# Patient Record
Sex: FEMALE | Race: WHITE | NOT HISPANIC OR LATINO | Employment: OTHER | URBAN - METROPOLITAN AREA
[De-identification: names, ages, dates, MRNs, and addresses within clinical notes are randomized per-mention and may not be internally consistent; named-entity substitution may affect disease eponyms.]

---

## 2017-03-02 ENCOUNTER — OFFICE VISIT - RIVER FALLS (OUTPATIENT)
Dept: FAMILY MEDICINE | Facility: CLINIC | Age: 72
End: 2017-03-02

## 2017-03-02 ASSESSMENT — MIFFLIN-ST. JEOR: SCORE: 1307.34

## 2017-03-30 ENCOUNTER — OFFICE VISIT - RIVER FALLS (OUTPATIENT)
Dept: FAMILY MEDICINE | Facility: CLINIC | Age: 72
End: 2017-03-30

## 2017-05-25 ENCOUNTER — OFFICE VISIT - RIVER FALLS (OUTPATIENT)
Dept: FAMILY MEDICINE | Facility: CLINIC | Age: 72
End: 2017-05-25

## 2017-05-25 ASSESSMENT — MIFFLIN-ST. JEOR: SCORE: 1311.87

## 2017-06-01 ENCOUNTER — OFFICE VISIT - RIVER FALLS (OUTPATIENT)
Dept: FAMILY MEDICINE | Facility: CLINIC | Age: 72
End: 2017-06-01

## 2017-06-22 ENCOUNTER — OFFICE VISIT - RIVER FALLS (OUTPATIENT)
Dept: FAMILY MEDICINE | Facility: CLINIC | Age: 72
End: 2017-06-22

## 2017-06-26 ENCOUNTER — AMBULATORY - RIVER FALLS (OUTPATIENT)
Dept: FAMILY MEDICINE | Facility: CLINIC | Age: 72
End: 2017-06-26

## 2017-06-27 ENCOUNTER — AMBULATORY - RIVER FALLS (OUTPATIENT)
Dept: FAMILY MEDICINE | Facility: CLINIC | Age: 72
End: 2017-06-27

## 2017-07-01 ENCOUNTER — OFFICE VISIT - RIVER FALLS (OUTPATIENT)
Dept: FAMILY MEDICINE | Facility: CLINIC | Age: 72
End: 2017-07-01

## 2017-07-17 ENCOUNTER — AMBULATORY - RIVER FALLS (OUTPATIENT)
Dept: FAMILY MEDICINE | Facility: CLINIC | Age: 72
End: 2017-07-17

## 2017-07-18 LAB
CREAT SERPL-MCNC: 0.82 MG/DL (ref 0.6–0.93)
GLUCOSE BLD-MCNC: 134 MG/DL (ref 65–99)

## 2017-07-24 ENCOUNTER — OFFICE VISIT - RIVER FALLS (OUTPATIENT)
Dept: FAMILY MEDICINE | Facility: CLINIC | Age: 72
End: 2017-07-24

## 2017-07-24 ASSESSMENT — MIFFLIN-ST. JEOR: SCORE: 1316.41

## 2017-08-08 ENCOUNTER — OFFICE VISIT - RIVER FALLS (OUTPATIENT)
Dept: FAMILY MEDICINE | Facility: CLINIC | Age: 72
End: 2017-08-08

## 2017-08-15 LAB
CREAT SERPL-MCNC: 0.9 MG/DL (ref 0.6–0.93)
GLUCOSE BLD-MCNC: 108 MG/DL (ref 65–99)

## 2017-10-05 ENCOUNTER — OFFICE VISIT - RIVER FALLS (OUTPATIENT)
Dept: FAMILY MEDICINE | Facility: CLINIC | Age: 72
End: 2017-10-05

## 2017-10-05 ASSESSMENT — MIFFLIN-ST. JEOR: SCORE: 1291.91

## 2018-03-08 ENCOUNTER — OFFICE VISIT - RIVER FALLS (OUTPATIENT)
Dept: FAMILY MEDICINE | Facility: CLINIC | Age: 73
End: 2018-03-08

## 2018-03-12 ENCOUNTER — OFFICE VISIT - RIVER FALLS (OUTPATIENT)
Dept: FAMILY MEDICINE | Facility: CLINIC | Age: 73
End: 2018-03-12

## 2018-03-12 ASSESSMENT — MIFFLIN-ST. JEOR: SCORE: 1299.17

## 2018-03-13 LAB
CHOLEST SERPL-MCNC: 187 MG/DL
CHOLEST/HDLC SERPL: 3.3 {RATIO}
CREAT SERPL-MCNC: 0.81 MG/DL (ref 0.6–0.93)
GLUCOSE BLD-MCNC: 106 MG/DL (ref 65–99)
HDLC SERPL-MCNC: 57 MG/DL
LDLC SERPL CALC-MCNC: 107 MG/DL
NONHDLC SERPL-MCNC: 130 MG/DL
TRIGL SERPL-MCNC: 124 MG/DL

## 2018-03-19 ENCOUNTER — OFFICE VISIT - RIVER FALLS (OUTPATIENT)
Dept: FAMILY MEDICINE | Facility: CLINIC | Age: 73
End: 2018-03-19

## 2018-03-19 ENCOUNTER — AMBULATORY - RIVER FALLS (OUTPATIENT)
Dept: FAMILY MEDICINE | Facility: CLINIC | Age: 73
End: 2018-03-19

## 2018-03-27 ENCOUNTER — OFFICE VISIT - RIVER FALLS (OUTPATIENT)
Dept: FAMILY MEDICINE | Facility: CLINIC | Age: 73
End: 2018-03-27

## 2018-04-17 ENCOUNTER — OFFICE VISIT - RIVER FALLS (OUTPATIENT)
Dept: FAMILY MEDICINE | Facility: CLINIC | Age: 73
End: 2018-04-17

## 2018-08-20 ENCOUNTER — OFFICE VISIT - RIVER FALLS (OUTPATIENT)
Dept: FAMILY MEDICINE | Facility: CLINIC | Age: 73
End: 2018-08-20

## 2018-08-20 ASSESSMENT — MIFFLIN-ST. JEOR: SCORE: 1281.03

## 2018-09-06 ENCOUNTER — OFFICE VISIT - RIVER FALLS (OUTPATIENT)
Dept: FAMILY MEDICINE | Facility: CLINIC | Age: 73
End: 2018-09-06

## 2018-10-11 ENCOUNTER — OFFICE VISIT - RIVER FALLS (OUTPATIENT)
Dept: FAMILY MEDICINE | Facility: CLINIC | Age: 73
End: 2018-10-11

## 2019-02-28 ENCOUNTER — OFFICE VISIT - RIVER FALLS (OUTPATIENT)
Dept: FAMILY MEDICINE | Facility: CLINIC | Age: 74
End: 2019-02-28

## 2019-03-07 ENCOUNTER — OFFICE VISIT - RIVER FALLS (OUTPATIENT)
Dept: FAMILY MEDICINE | Facility: CLINIC | Age: 74
End: 2019-03-07

## 2019-03-13 ENCOUNTER — OFFICE VISIT - RIVER FALLS (OUTPATIENT)
Dept: FAMILY MEDICINE | Facility: CLINIC | Age: 74
End: 2019-03-13

## 2019-03-13 ASSESSMENT — MIFFLIN-ST. JEOR: SCORE: 1295.54

## 2019-03-14 LAB
BUN SERPL-MCNC: 20 MG/DL (ref 7–25)
BUN/CREAT RATIO - HISTORICAL: ABNORMAL (ref 6–22)
CALCIUM SERPL-MCNC: 9.5 MG/DL (ref 8.6–10.4)
CHLORIDE BLD-SCNC: 103 MMOL/L (ref 98–110)
CO2 SERPL-SCNC: 27 MMOL/L (ref 20–32)
CREAT SERPL-MCNC: 0.87 MG/DL (ref 0.6–0.93)
EGFRCR SERPLBLD CKD-EPI 2021: 66 ML/MIN/1.73M2
GLUCOSE BLD-MCNC: 108 MG/DL (ref 65–99)
POTASSIUM BLD-SCNC: 3.9 MMOL/L (ref 3.5–5.3)
SODIUM SERPL-SCNC: 140 MMOL/L (ref 135–146)
TSH SERPL DL<=0.005 MIU/L-ACNC: 4.45 MIU/L (ref 0.4–4.5)

## 2019-07-30 ENCOUNTER — OFFICE VISIT - RIVER FALLS (OUTPATIENT)
Dept: FAMILY MEDICINE | Facility: CLINIC | Age: 74
End: 2019-07-30

## 2019-09-05 ENCOUNTER — OFFICE VISIT - RIVER FALLS (OUTPATIENT)
Dept: FAMILY MEDICINE | Facility: CLINIC | Age: 74
End: 2019-09-05

## 2019-11-26 ENCOUNTER — OFFICE VISIT - RIVER FALLS (OUTPATIENT)
Dept: FAMILY MEDICINE | Facility: CLINIC | Age: 74
End: 2019-11-26

## 2020-03-03 ENCOUNTER — OFFICE VISIT - RIVER FALLS (OUTPATIENT)
Dept: FAMILY MEDICINE | Facility: CLINIC | Age: 75
End: 2020-03-03

## 2020-03-25 ENCOUNTER — OFFICE VISIT - RIVER FALLS (OUTPATIENT)
Dept: FAMILY MEDICINE | Facility: CLINIC | Age: 75
End: 2020-03-25

## 2020-06-29 ENCOUNTER — OFFICE VISIT - RIVER FALLS (OUTPATIENT)
Dept: FAMILY MEDICINE | Facility: CLINIC | Age: 75
End: 2020-06-29

## 2020-06-29 ASSESSMENT — MIFFLIN-ST. JEOR: SCORE: 1303.71

## 2020-08-26 ENCOUNTER — OFFICE VISIT - RIVER FALLS (OUTPATIENT)
Dept: FAMILY MEDICINE | Facility: CLINIC | Age: 75
End: 2020-08-26

## 2020-09-01 ENCOUNTER — AMBULATORY - RIVER FALLS (OUTPATIENT)
Dept: FAMILY MEDICINE | Facility: CLINIC | Age: 75
End: 2020-09-01

## 2020-09-02 LAB
BUN SERPL-MCNC: 19 MG/DL (ref 7–25)
BUN/CREAT RATIO - HISTORICAL: ABNORMAL (ref 6–22)
CALCIUM SERPL-MCNC: 9.5 MG/DL (ref 8.6–10.4)
CHLORIDE BLD-SCNC: 102 MMOL/L (ref 98–110)
CHOLEST SERPL-MCNC: 178 MG/DL
CHOLEST/HDLC SERPL: 4 {RATIO}
CO2 SERPL-SCNC: 29 MMOL/L (ref 20–32)
CREAT SERPL-MCNC: 0.9 MG/DL (ref 0.6–0.93)
EGFRCR SERPLBLD CKD-EPI 2021: 63 ML/MIN/1.73M2
GLUCOSE BLD-MCNC: 104 MG/DL (ref 65–99)
HDLC SERPL-MCNC: 45 MG/DL
LDLC SERPL CALC-MCNC: 107 MG/DL
NONHDLC SERPL-MCNC: 133 MG/DL
POTASSIUM BLD-SCNC: 4 MMOL/L (ref 3.5–5.3)
SODIUM SERPL-SCNC: 139 MMOL/L (ref 135–146)
TRIGL SERPL-MCNC: 147 MG/DL
TSH SERPL DL<=0.005 MIU/L-ACNC: 3.35 MIU/L (ref 0.4–4.5)

## 2020-09-08 ENCOUNTER — COMMUNICATION - RIVER FALLS (OUTPATIENT)
Dept: FAMILY MEDICINE | Facility: CLINIC | Age: 75
End: 2020-09-08

## 2020-09-16 ENCOUNTER — OFFICE VISIT - RIVER FALLS (OUTPATIENT)
Dept: FAMILY MEDICINE | Facility: CLINIC | Age: 75
End: 2020-09-16

## 2020-09-16 ASSESSMENT — MIFFLIN-ST. JEOR: SCORE: 1302.8

## 2020-11-17 ENCOUNTER — OFFICE VISIT - RIVER FALLS (OUTPATIENT)
Dept: FAMILY MEDICINE | Facility: CLINIC | Age: 75
End: 2020-11-17

## 2020-11-24 ENCOUNTER — OFFICE VISIT - RIVER FALLS (OUTPATIENT)
Dept: FAMILY MEDICINE | Facility: CLINIC | Age: 75
End: 2020-11-24

## 2020-12-08 ENCOUNTER — OFFICE VISIT - RIVER FALLS (OUTPATIENT)
Dept: FAMILY MEDICINE | Facility: CLINIC | Age: 75
End: 2020-12-08

## 2021-05-04 ENCOUNTER — OFFICE VISIT - RIVER FALLS (OUTPATIENT)
Dept: FAMILY MEDICINE | Facility: CLINIC | Age: 76
End: 2021-05-04

## 2021-09-20 ENCOUNTER — TRANSFERRED RECORDS (OUTPATIENT)
Dept: MULTI SPECIALTY CLINIC | Facility: CLINIC | Age: 76
End: 2021-09-20

## 2021-09-20 ENCOUNTER — OFFICE VISIT - RIVER FALLS (OUTPATIENT)
Dept: FAMILY MEDICINE | Facility: CLINIC | Age: 76
End: 2021-09-20

## 2021-09-20 ASSESSMENT — MIFFLIN-ST. JEOR: SCORE: 1295.54

## 2021-09-21 ENCOUNTER — COMMUNICATION - RIVER FALLS (OUTPATIENT)
Dept: FAMILY MEDICINE | Facility: CLINIC | Age: 76
End: 2021-09-21

## 2021-09-21 LAB
BUN SERPL-MCNC: 18 MG/DL (ref 7–25)
BUN/CREAT RATIO - HISTORICAL: ABNORMAL (ref 6–22)
CALCIUM SERPL-MCNC: 9.8 MG/DL (ref 8.6–10.4)
CHLORIDE BLD-SCNC: 104 MMOL/L (ref 98–110)
CHOLEST SERPL-MCNC: 199 MG/DL
CHOLEST/HDLC SERPL: 4 {RATIO}
CO2 SERPL-SCNC: 28 MMOL/L (ref 20–32)
CREAT SERPL-MCNC: 0.86 MG/DL (ref 0.6–0.93)
EGFRCR SERPLBLD CKD-EPI 2021: 66 ML/MIN/1.73M2
GLUCOSE BLD-MCNC: 101 MG/DL (ref 65–99)
HDLC SERPL-MCNC: 50 MG/DL
LDLC SERPL CALC-MCNC: 120 MG/DL
NONHDLC SERPL-MCNC: 149 MG/DL
POTASSIUM BLD-SCNC: 3.9 MMOL/L (ref 3.5–5.3)
SODIUM SERPL-SCNC: 141 MMOL/L (ref 135–146)
TRIGL SERPL-MCNC: 176 MG/DL
TSH SERPL DL<=0.005 MIU/L-ACNC: 1.97 MIU/L (ref 0.4–4.5)

## 2021-10-27 ENCOUNTER — COMMUNICATION - RIVER FALLS (OUTPATIENT)
Dept: FAMILY MEDICINE | Facility: CLINIC | Age: 76
End: 2021-10-27

## 2021-11-02 ENCOUNTER — OFFICE VISIT - RIVER FALLS (OUTPATIENT)
Dept: FAMILY MEDICINE | Facility: CLINIC | Age: 76
End: 2021-11-02

## 2021-11-23 ENCOUNTER — OFFICE VISIT - RIVER FALLS (OUTPATIENT)
Dept: FAMILY MEDICINE | Facility: CLINIC | Age: 76
End: 2021-11-23

## 2022-02-12 VITALS
HEIGHT: 68 IN | TEMPERATURE: 98 F | HEART RATE: 76 BPM | WEIGHT: 167.2 LBS | DIASTOLIC BLOOD PRESSURE: 82 MMHG | SYSTOLIC BLOOD PRESSURE: 130 MMHG | BODY MASS INDEX: 25.34 KG/M2

## 2022-02-12 VITALS
TEMPERATURE: 97.6 F | SYSTOLIC BLOOD PRESSURE: 128 MMHG | DIASTOLIC BLOOD PRESSURE: 80 MMHG | WEIGHT: 168.4 LBS | BODY MASS INDEX: 25.61 KG/M2 | DIASTOLIC BLOOD PRESSURE: 88 MMHG | BODY MASS INDEX: 25.58 KG/M2 | WEIGHT: 168.8 LBS | TEMPERATURE: 97.3 F | SYSTOLIC BLOOD PRESSURE: 130 MMHG | HEART RATE: 64 BPM | HEIGHT: 68 IN | SYSTOLIC BLOOD PRESSURE: 149 MMHG | DIASTOLIC BLOOD PRESSURE: 78 MMHG | HEART RATE: 72 BPM

## 2022-02-12 VITALS
WEIGHT: 164.8 LBS | DIASTOLIC BLOOD PRESSURE: 88 MMHG | HEART RATE: 68 BPM | HEIGHT: 68 IN | SYSTOLIC BLOOD PRESSURE: 146 MMHG | TEMPERATURE: 97.1 F | BODY MASS INDEX: 24.98 KG/M2

## 2022-02-12 VITALS
HEART RATE: 69 BPM | TEMPERATURE: 98.8 F | DIASTOLIC BLOOD PRESSURE: 80 MMHG | HEIGHT: 68 IN | BODY MASS INDEX: 26.01 KG/M2 | WEIGHT: 171.6 LBS | SYSTOLIC BLOOD PRESSURE: 126 MMHG

## 2022-02-12 VITALS
BODY MASS INDEX: 25.7 KG/M2 | WEIGHT: 169.6 LBS | HEIGHT: 68 IN | DIASTOLIC BLOOD PRESSURE: 97 MMHG | BODY MASS INDEX: 25.82 KG/M2 | DIASTOLIC BLOOD PRESSURE: 80 MMHG | BODY MASS INDEX: 25.82 KG/M2 | SYSTOLIC BLOOD PRESSURE: 149 MMHG | HEART RATE: 78 BPM | SYSTOLIC BLOOD PRESSURE: 140 MMHG | OXYGEN SATURATION: 95 % | HEIGHT: 68 IN | HEIGHT: 68 IN | TEMPERATURE: 98.3 F | HEART RATE: 76 BPM

## 2022-02-12 VITALS
WEIGHT: 168 LBS | HEIGHT: 68 IN | SYSTOLIC BLOOD PRESSURE: 142 MMHG | DIASTOLIC BLOOD PRESSURE: 90 MMHG | DIASTOLIC BLOOD PRESSURE: 80 MMHG | HEART RATE: 68 BPM | BODY MASS INDEX: 25.46 KG/M2 | TEMPERATURE: 97.8 F | SYSTOLIC BLOOD PRESSURE: 150 MMHG | HEART RATE: 80 BPM

## 2022-02-12 VITALS — DIASTOLIC BLOOD PRESSURE: 90 MMHG | SYSTOLIC BLOOD PRESSURE: 146 MMHG

## 2022-02-12 VITALS
DIASTOLIC BLOOD PRESSURE: 89 MMHG | TEMPERATURE: 98 F | WEIGHT: 172.6 LBS | HEIGHT: 68 IN | HEART RATE: 80 BPM | HEART RATE: 78 BPM | DIASTOLIC BLOOD PRESSURE: 86 MMHG | SYSTOLIC BLOOD PRESSURE: 154 MMHG | DIASTOLIC BLOOD PRESSURE: 78 MMHG | SYSTOLIC BLOOD PRESSURE: 130 MMHG | BODY MASS INDEX: 26.16 KG/M2 | SYSTOLIC BLOOD PRESSURE: 161 MMHG

## 2022-02-12 VITALS
TEMPERATURE: 98.4 F | WEIGHT: 169.8 LBS | HEART RATE: 72 BPM | DIASTOLIC BLOOD PRESSURE: 88 MMHG | HEIGHT: 68 IN | SYSTOLIC BLOOD PRESSURE: 148 MMHG | BODY MASS INDEX: 25.73 KG/M2

## 2022-02-12 VITALS
HEIGHT: 68 IN | BODY MASS INDEX: 25.46 KG/M2 | TEMPERATURE: 97.6 F | HEART RATE: 72 BPM | DIASTOLIC BLOOD PRESSURE: 94 MMHG | SYSTOLIC BLOOD PRESSURE: 166 MMHG | WEIGHT: 168 LBS

## 2022-02-12 VITALS — SYSTOLIC BLOOD PRESSURE: 138 MMHG | DIASTOLIC BLOOD PRESSURE: 86 MMHG

## 2022-02-12 VITALS
SYSTOLIC BLOOD PRESSURE: 152 MMHG | BODY MASS INDEX: 25.85 KG/M2 | DIASTOLIC BLOOD PRESSURE: 96 MMHG | TEMPERATURE: 97.1 F | HEART RATE: 72 BPM | HEIGHT: 68 IN | WEIGHT: 170.6 LBS

## 2022-02-12 VITALS — DIASTOLIC BLOOD PRESSURE: 86 MMHG | SYSTOLIC BLOOD PRESSURE: 136 MMHG

## 2022-02-15 NOTE — TELEPHONE ENCOUNTER
Entered by Vannessa Laird on March 24, 2020 12:50:25 PM CDT  Patient is not on the schedule for  7/6/20. I LVM to call back and reschedule AWV for July/August.      ---------------------  From: Vinay WHIPPLE, [place]   To: Appointment Pool ([id number] - [place]);     Sent: 3/24/2020 11:28:44 AM CDT  Subject: General Message     CSS spoke w/ pt re: appt for 3/25/2020 w/ [facility]TG.  Per pt, she spoke w/ someone to r/s for 7/6/2020.  Please remove pt from [facility]TG schedule tomorrow.  Thanks.Daysi's apppointment for today is removed.

## 2022-02-15 NOTE — PROGRESS NOTES
Chief Complaint  Blood Pressure follow up  History of Present Illness  Daysi is here for follow-up on her hypertension. ?Over the last couple of months blood pressure has been consistently in the 150s over 100s. ?She denies chest pain, shortness of breath, REESE, PND, or lower extremity edema. ?Labs done in March were all within normal limits.  Review of Systems  Constitutional: No fever, No chills, No sweats, No weakness, No fatigue.  Eye: No recent visual problem.  Ear/Nose/Mouth/Throat: No decreased hearing, No nasal congestion, No sore throat.  Respiratory: No shortness of breath, No cough.  Cardiovascular: No chest pain, No palpitations, No peripheral edema.  Gastrointestinal: No nausea, No vomiting, No diarrhea, No constipation, No heartburn.  Genitourinary: No dysuria, No change in urine stream.  Hematology/Lymphatics: No bruising tendency, No bleeding tendency.  Endocrine: No cold intolerance, No heat intolerance.  Musculoskeletal: No back pain, No neck pain, No joint pain, No muscle pain.  Integumentary: No rash.  Neurologic: Alert and oriented X4, No headache.  Psychiatric: No anxiety, No depression.  All other systems were reviewed and are negative.  Problem List/Past Medical History  Ongoing  Essential hypertension  Hypothyroidism  Lipid metabolism disorder  Macular degeneration, dry  Resolved  No resolved problems  Procedure/Surgical History  Cataract extraction and insertion of intraocular lens (03/19/2015),  Repair of rectal prolapse (02/06/2012),  Appendectomy,  Hysterectomy.  Home Medications  atorvastatin 10 mg oral tablet, 1 tab(s), po, hs, 2 refills  levothyroxine 100 mcg (0.1 mg) oral tablet, 1 tab(s), po, daily, 2 refills  losartan 25 mg oral tablet, 1 tab(s), po, daily, 2 refills  Allergies  No known allergies  Social History  Alcohol  Current, social, 3 drinks/episode maximum.  Employment and Education  Retired  Exercise and Physical Activity  Exercise frequency: Daily. Exercise type:  Walking.  Home and Environment  5 children.  Sexual  Sexually active: No.  Substance Abuse  Never  Tobacco  Past  Family History  Positive for hypertension  Immunizations  Up-to-date  Physical Exam  Vitals & Measurements  T:?98.8?(Tympanic)?  HR:?80?(Peripheral)?  BP:?154/104?  HT:?68?in?  WT:?171.6?lb?  BMI:?26.09?  Alert, oriented, no acute distress  Neck is supple without adenopathy thyromegaly or carotid bruit  Lungs are clear  Heart has regular rate and rhythm  No lower extremity edema  Lab Results  Previous results reviewed  Assessment/Plan  Essential hypertension  Increase losartan to 50 mg daily  Referral to?chronic care management  Follow-up in 1 month  We will have blood pressure checked early next week

## 2022-02-15 NOTE — CARE COORDINATION
CC called and spoke to pt re: elevated BP readings on 9/20. She continues to monitor BP's at home w/ normal readings. BP machine was already compared for accuracy. She is feeling much better off Fosamax. Feeling good. CC advised due for px in March and advised to call if anything further is needed. She agreed.

## 2022-02-15 NOTE — CARE COORDINATION
Patient:   MARIAJOSE FAJARDO            MRN: [id number]            FIN: [id number]               Age:   71 years     Sex:  Female     :  1945   Associated Diagnoses:   None   Author:   Ada Lopez CMA      Comprehensive Care Plan    Risk Assessment Score:  Moderate range 9    Medical Health Care Team:  (Care Team Members are people and/or organizations who help you manage your health)    Primary Care Provider: Kerline      Clinical Assistant: [place]       Care Coordinator Name:  Ada [phone]  (Specialists, Dentist, Eye Care, Home Health Agencies, Meals on Wheels, Physical Therapy, Dialysis, Housing Facilities, Respite/ Programs)     I would like to communicate by:  _X Phone   _ Portal (information given)  X_ Letters   X_ Face to face visit      Personal Support Team:  (Family Members, Yazdan[employer], Neighbors, Anyone who will help)  _Kristi Sharma [phone]      The Most important information you need to know about me:  (This is for you to record important details about your health and life that will help health care professionals understand your needs.)    _    I have:   _ Advance Directives     _ Living Will     _X Power of      _ Other: Will talk to her about honoring choices at next visit     I have challenges and/or concerns with:   (Identify the types of problems or concerns you are currently facing as you manage your health.  Sharing your concerns helps your Care Team assist you.)     _ Thinking/Memory Problems     _ Spiritual Support     _ Vision     _ Transportation     _ Family Issues     _ Emotional Issues     _ Stress Management     _ Hearing     _ Access to Health Care     _ My Ability to Manage my chronic Conditions     _ Financial Issues     _ Safety    _ Mobility (balance, falls, moving around)     _ Other: _  Comments: No specific concerns at this time     I have dietary needs:   Yes    X  No       Comments:_    I live:  X_ Alone     _ With a spouse/partner     _ With family      _ With others     _ In assisted living     _ In a nursing home     _ Other  Comments:_    I learn best by:   _X Reading     _ Being Spoken to     _ Being shown     _ Listening to audio     _ Seeing pictures or videos     _ Other  Comments: _    Additional Information:          My goals for working toward better health:    _Working on High blood pressure   _  _Patient also has hypothyroidism which is stable at this time   _Macular Degeneration is also a concern for her     What small steps can I take to reach these goals?  What will make reaching these goals difficult? :    _Just made a recent medication adjustment Losartan increased to 50mg a day  _  _Daysi is also starting to pay closer attention to the salt in her diet and making changes  _    Your Providers goals toward better health:    _  _Goal if for her blood pressure to be less than 140/90  _  _Try to keep other concerns of her care stable     Medications                    atorvastatin 10 mg oral tablet: 1 tab(s), po, hs, for 90 day(s), 90 tab(s), 2 Refill(s).          levothyroxine 100 mcg (0.1 mg) oral tablet: 1 tab(s), po, daily, for 90 day(s), 90 tab(s), 2 Refill(s).          losartan 50 mg oral tablet: 1 tab(s), po, daily, 30 tab(s), 0 Refill(s).    Allergies          No known allergies  Problems        Hypertension    Macular degeneration   Hypothyroidism   Hyperlipidemia     On a scale from 1 to 10: (1 not good at all - 10 my health is great)   1.  Most of the time would you say your health is:  8     2.  Compared to one year ago, how would you rate your health in general now:  _  Much better     _  Somewhat better     X_About the same     _  Somewhat worse     _  Much worse    Contact will be made with patient:  _ Monthly   _ Biyearly   _ Yearly   _X As patient needs   _X As provider needs

## 2022-02-15 NOTE — LETTER
(Inserted Image. Unable to display)   [address] [place]  [address]  September 21, 2021                      MARIAJOSE FAJARDO      [address] [address]        Dear MARIAJOSE,    Thank you for selecting [facility] for your health care needs.  Below you will find the results of your recent test(s) done at our clinic.     Your numbers look good.  The cholesterol levels are a bit higher than in the past.      Result Name Current Result Previous Result Reference Range   Cholesterol (mg/dL)  199 9/20/2021  178 9/1/2020  - <200   HDL (mg/dL)  50 9/20/2021 ((L)) 45 9/1/2020 > OR = 50 -    Triglyceride (mg/dL) ((H)) 176 9/20/2021  147 9/1/2020  - <150   LDL ((H)) 120 9/20/2021 ((H)) 107 9/1/2020    Cholesterol/HDL Ratio  4.0 9/20/2021  4.0 9/1/2020  - <5.0   Non-HDL Cholesterol ((H)) 149 9/20/2021 ((H)) 133 9/1/2020  - <130   Glucose Level (mg/dL) ((H)) 101 9/20/2021 ((H)) 104 9/1/2020 65 - 99   BUN (mg/dL)  18 9/20/2021  19 9/1/2020 7 - 25   Creatinine Level (mg/dL)  0.86 9/20/2021  0.90 9/1/2020 0.60 - 0.93   eGFR (mL/min/1.73m2)  66 9/20/2021  63 9/1/2020 > OR = 60 -    eGFR  (mL/min/1.73m2)  76 9/20/2021  72 9/1/2020 > OR = 60 -    BUN/Creat Ratio  NOT APPLICABLE 9/20/2021  NOT APPLICABLE 9/1/2020 6 - 22   Sodium Level (mmol/L)  141 9/20/2021  139 9/1/2020 135 - 146   Potassium Level (mmol/L)  3.9 9/20/2021  4.0 9/1/2020 3.5 - 5.3   Chloride Level (mmol/L)  104 9/20/2021  102 9/1/2020 98 - 110   CO2 Level (mmol/L)  28 9/20/2021  29 9/1/2020 20 - 32   Calcium Level (mg/dL)  9.8 9/20/2021  9.5 9/1/2020 8.6 - 10.4   TSH (mIU/L)  1.97 9/20/2021  3.35 9/1/2020 0.40 - 4.50       Please contact me or my assistant at [phone] if you have any questions about your results.    Sincerely,        George Churchill MD        What do your labs mean?  Below is a glossary of commonly ordered labs:  LDL   Bad Cholesterol   HDL   Good Cholesterol  AST/ALT    Liver Function   Cr/Creatinine   Kidney Function  Microalbumin   Kidney Function  BUN   Kidney Function  PSA   Prostate    TSH   Thyroid Hormone  HgbA1c   Diabetes Test   Hgb (Hemoglobin)   Red Blood Cells

## 2022-02-15 NOTE — LETTER
(Inserted Image. Unable to display)   September 08, 2020        MARIAJOSE FAJARDO      [address]    [place], [place] [id number]        Dear MARIAJOSE,    Thank you for selecting [facility] for your health care needs.  Below you will find the results of your recent test(s) done at our clinic.     Your tests look good.  We will discuss the results at your upcoming visit.      Result Name Current Result Previous Result Reference Range   Sodium Level (mmol/L)  139 9/1/2020  140 3/13/2019 135 - 146   Potassium Level (mmol/L)  4.0 9/1/2020  3.9 3/13/2019 3.5 - 5.3   Chloride Level (mmol/L)  102 9/1/2020  103 3/13/2019 98 - 110   CO2 Level (mmol/L)  29 9/1/2020  27 3/13/2019 20 - 32   Glucose Level (mg/dL) ((H)) 104 9/1/2020 ((H)) 108 3/13/2019 65 - 99   BUN (mg/dL)  19 9/1/2020  20 3/13/2019 7 - 25   Creatinine Level (mg/dL)  0.90 9/1/2020  0.87 3/13/2019 0.60 - 0.93   eGFR (mL/min/1.73m2)  63 9/1/2020  66 3/13/2019 > OR = 60 -    Calcium Level (mg/dL)  9.5 9/1/2020  9.5 3/13/2019 8.6 - 10.4   Cholesterol (mg/dL)  178 9/1/2020   - <200   Non-HDL Cholesterol ((H)) 133 9/1/2020   - <130   HDL (mg/dL) ((L)) 45 9/1/2020  > OR = 50 -    Cholesterol/HDL Ratio  4.0 9/1/2020   - <5.0   LDL ((H)) 107 9/1/2020     Triglyceride (mg/dL)  147 9/1/2020   - <150   TSH (mIU/L)  3.35 9/1/2020  4.45 3/13/2019 0.40 - 4.50       Please contact me or my assistant at [phone] if you have any questions about your results.    Sincerely,        George Churchill MD        What do your labs mean?  Below is a glossary of commonly ordered labs:  LDL   Bad Cholesterol   HDL   Good Cholesterol  AST/ALT   Liver Function   Cr/Creatinine   Kidney Function  Microalbumin   Kidney Function  BUN   Kidney Function  PSA   Prostate    TSH   Thyroid Hormone  HgbA1c   Diabetes Test   Hgb (Hemoglobin)   Red Blood Cells

## 2022-02-15 NOTE — CARE COORDINATION
ACTION PLAN   Goal(s): Hypertension management   Other concerns hypothyroid and macular degeneration to try to keep stable         Today s Date:              05/31/2017                          Goal(s) completion date:      Steps to be taken  High blood pressure  When will I accomplish these steps Barriers Status      05/31/2017  Essential hypertension  Increase losartan to 50 mg daily  Follow-up in 1 month  We will have blood pressure checked early next week  Recent Blood pressure. Improving and discussed goal.    176/95 P66  165/92 P69  Feeling well and doing well on new meds.  Will stop in again next Monday for BP check.   06/09/2017  Blood pressures are still running high.   Dr. KNOWLES wanted her to take 100mg of losartan a day.  She has enough at home to double her dose and then we will check again next Friday.       Steps to be taken When will I accomplish these steps Barriers Status

## 2022-02-15 NOTE — NURSING NOTE
Comprehensive Intake Entered On:  2/28/2019 10:27 AM CST    Performed On:  2/28/2019 10:24 AM CST by Nicki Bates MA               Summary   Chief Complaint :   Rubber part of hearing aid possibly in left ear   Ht/Wt Measurement Refused by Patient? :   Yes   Systolic Blood Pressure :   150 mmHg (HI)    Diastolic Blood Pressure :   90 mmHg (HI)    Mean Arterial Pressure :   110 mmHg   Peripheral Pulse Rate :   80 bpm   BP Site :   Left arm   Pulse Site :   Radial artery   BP Method :   Manual   HR Method :   Manual   Nicki Bates MA - 2/28/2019 10:24 AM CST   Health Status   Allergies Verified? :   Yes   Medication History Verified? :   Yes   Immunizations Current :   Yes   Medical History Verified? :   Yes   Pre-Visit Planning Status :   Completed   Tobacco Use? :   Heavy tobacco smoker   Nicki Bates MA - 2/28/2019 10:24 AM CST   Consents   Consent for Immunization Exchange :   Consent Granted   Consent for Immunizations to Providers :   Consent Granted   Nicki Bates MA - 2/28/2019 10:24 AM CST   Meds / Allergies   (As Of: 2/28/2019 10:27:48 AM CST)   Allergies (Active)   alendronate  Estimated Onset Date:   Unspecified ; Reactions:   itchiness ; Created By:   Selma Mclean MA; Reaction Status:   Active ; Category:   Drug ; Substance:   alendronate ; Type:   Allergy ; Updated By:   Selma Mclean MA; Source:   Patient ; Reviewed Date:   2/28/2019 10:25 AM CST        Medication List   (As Of: 2/28/2019 10:27:48 AM CST)   Prescription/Discharge Order    hydroCHLOROthiazide  :   hydroCHLOROthiazide ; Status:   Prescribed ; Ordered As Mnemonic:   hydroCHLOROthiazide 25 mg oral tablet ; Simple Display Line:   1 tab(s), po, daily, for 90 day(s), 90 tab(s), 3 Refill(s) ; Ordering Provider:   Nelson Churchill MD; Catalog Code:   hydroCHLOROthiazide ; Order Dt/Tm:   5/16/2018 4:45:26 PM          losartan  :   losartan ; Status:   Prescribed ; Ordered As Mnemonic:   losartan 100 mg oral tablet ; Simple  Display Line:   100 mg, 1 tab(s), PO, Daily, 90 tab(s), 3 Refill(s) ; Ordering Provider:   Nelson Churchill MD; Catalog Code:   losartan ; Order Dt/Tm:   3/12/2018 12:27:29 PM          levothyroxine  :   levothyroxine ; Status:   Prescribed ; Ordered As Mnemonic:   levothyroxine 100 mcg (0.1 mg) oral tablet ; Simple Display Line:   1 tab(s), po, daily, for 90 day(s), 90 tab(s), 3 Refill(s) ; Ordering Provider:   Nelson Churchill MD; Catalog Code:   levothyroxine ; Order Dt/Tm:   3/12/2018 10:38:23 AM          atorvastatin  :   atorvastatin ; Status:   Prescribed ; Ordered As Mnemonic:   atorvastatin 10 mg oral tablet ; Simple Display Line:   1 tab(s), po, hs, for 90 day(s), 90 tab(s), 3 Refill(s) ; Ordering Provider:   Nelson Churchill MD; Catalog Code:   atorvastatin ; Order Dt/Tm:   3/12/2018 10:38:14 AM

## 2022-02-15 NOTE — TELEPHONE ENCOUNTER
Entered by Kacy Sanchez on August 26, 2020 9:07:49 AM CDT  PCP:   [facility]    Medication:   HCTZ and Levothyroxine   Last Filled:  7/29/20   Quantity:  30 Refills:  0    Date of last office visit and reason:   6/29/20 Tick bite   Date of last labs pertaining to condition:  _    Note:  Called pt at 0902, transferred to scheduling to make appointment. Will fill for 1 month per protocol     Return to Clinic order placed?  6/30/20 due now for AWV and fasting labs    Resource:   _  Phone:   _            ------------------------------------------  From: [facility] #[id number]  To: Nelson Churchill MD  Sent: August 26, 2020 3:44:06 AM CDT  Subject: Medication Management  Due: August 12, 2020 4:17:26 PM CDT     ** On Hold Pending Signature **     Dispensed Drug: levothyroxine (levothyroxine 100 mcg (0.1 mg) oral tablet), TAKE 1 TABLET BY MOUTH DAILY  Quantity: 30 tab(s)  Days Supply: 30  Refills: 0  Substitutions Allowed  Notes from Pharmacy:     ** On Hold Pending Signature **     Dispensed Drug: hydroCHLOROthiazide (hydroCHLOROthiazide 25 mg oral tablet), TAKE 1 TABLET BY MOUTH DAILY  Quantity: 30 tab(s)  Days Supply: 30  Refills: 0  Substitutions Allowed  Notes from Pharmacy:  ------------------------------------------  ** Submitted: **  Order:hydroCHLOROthiazide (hydroCHLOROthiazide 25 mg oral tablet)  1 tab(s)  Oral  daily  Qty:  30 tab(s)        Days Supply:  30        Refills:  0          Substitutions Allowed     Route To Pharmacy - [facility] #[id number]    Signed by Kacy Sanchez  8/26/2020 2:08:00 PM [facility]    ** Submitted: **  Complete:hydroCHLOROthiazide (hydroCHLOROthiazide 25 mg oral tablet)   Signed by Kacy Sanchez  8/26/2020 2:08:00 PM [facility]    ** Not Approved:  **  hydroCHLOROthiazide (HYDROCHLOROTHIAZIDE 25MG TABLETS)  TAKE 1 TABLET BY MOUTH DAILY  Qty:  30 tab(s)        Days Supply:  30        Refills:  0          Substitutions Allowed     Route To Pharmacy - [facility]  #[id number]   Signed by Kacy Sanchez---------------------  From: Kacy Sanchez   To: [facility] #[id number]    Sent: 8/26/2020 9:08:39 AM CDT  Subject: Medication Management     ** Submitted: **  Order:levothyroxine (levothyroxine 100 mcg (0.1 mg) oral tablet)  1 tab(s)  Oral  daily  Qty:  30 tab(s)        Days Supply:  30        Refills:  0          Substitutions Allowed     Route To [facility] #[id number]    Signed by Kacy Sanchez  8/26/2020 2:08:00 PM [facility]    ** Submitted: **  Complete:levothyroxine (levothyroxine 100 mcg (0.1 mg) oral tablet)   Signed by Kacy Sanchez  8/26/2020 2:08:00 PM [facility]    ** Not Approved:  **  levothyroxine (LEVOTHYROXINE 0.100MG (100MCG) TAB)  TAKE 1 TABLET BY MOUTH DAILY  Qty:  30 tab(s)        Days Supply:  30        Refills:  0          Substitutions Allowed     Route To [facility] #[id number]   Signed by Kacy Sanchez

## 2022-02-15 NOTE — TELEPHONE ENCOUNTER
Entered by Diana Parekh CMA on Alicia 10, 2019 3:23:32 PM CDT  ---------------------  From: Diana Parekh CMA   To: [employer] [id number]    Sent: 6/10/2019 3:23:32 PM CDT  Subject: Medication Management     ** Not Approved: Patient has requested refill too soon, LR on 3/13/19 x 1 yr **  hydroCHLOROthiazide (HYDROCHLOROTHIAZIDE 25MG TABLETS)  TAKE 1 TABLET BY MOUTH DAILY  Qty:  90 tab(s)        Days Supply:  90        Refills:  0          Substitutions Allowed     Route To Pharmacy - [facility] [id number]   Signed by Diana Parekh CMA            ------------------------------------------  From: [employer] [id number]  To: Nelson Churchill MD  Sent: June 9, 2019 3:28:26 PM CDT  Subject: Medication Management  Due: Alicia 10, 2019 3:28:26 PM CDT    ** On Hold Pending Signature **  Drug: hydroCHLOROthiazide (hydroCHLOROthiazide 25 mg oral tablet)  1 TAB(S) PO DAILY,X90 DAY(S)  Quantity: 90 tab(s)     Days Supply: 0         Refills: 2  Substitutions Allowed  Notes from Pharmacy:     Dispensed Drug: hydroCHLOROthiazide (hydroCHLOROthiazide 25 mg oral tablet)  TAKE 1 TABLET BY MOUTH DAILY  Quantity: 90 tab(s)     Days Supply: 90        Refills: 0  Substitutions Allowed  Notes from Pharmacy:   ------------------------------------------

## 2022-02-15 NOTE — PROGRESS NOTES
Patient:   MARIAJOSE FAJARDO            MRN: [id number]            FIN: [id number]               Age:   72 years     Sex:  Female     :  1945   Associated Diagnoses:   Preoperative clearance; Right cataract   Author:   Nelson Churchill MD      Chief Complaint   2018 7:57 AM CDT    preop---cataract surgery 18 at [facility] w/ Dr. Hernandez.        Preoperative Information   Indication for surgery:  Eye disorder, symptomatic cataract OD.    Accompanied by:  No one.    Source of history:  Self, Medical record.           Review of Systems   Constitutional:  No fever, No chills, No sweats, No weakness, No fatigue.    Eye:  Recent visual problem, Blurring.    Ear/Nose/Mouth/Throat:  No decreased hearing, No nasal congestion, No sore throat.    Respiratory:  No shortness of breath, No cough.    Cardiovascular:  Negative, No chest pain, No palpitations, No peripheral edema.    Gastrointestinal:  No nausea, No vomiting, No diarrhea, No constipation, No heartburn.    Genitourinary:  No dysuria, No change in urine stream.    Hematology/Lymphatics:  No bruising tendency, No bleeding tendency.    Endocrine:  No cold intolerance, No heat intolerance.    Immunologic:  Negative.    Musculoskeletal:  No back pain, No neck pain, No joint pain, No muscle pain.    Integumentary:  No rash, No dryness, No skin lesion.    Neurologic:  Alert and oriented X4, No headache.    Psychiatric:  No anxiety, No depression.       Health Status   Allergies:    Allergic Reactions (Selected)  Severity Not Documented  Alendronate (Itchiness)   Medications:  (Selected)   Prescriptions  Prescribed  atorvastatin 10 mg oral tablet: 1 tab(s), po, hs, x 90 day(s), # 90 tab(s), 3 Refill(s), Type: Physician Stop, Pharmacy: [id number], 1 tab(s) po hs,x90 day(s)  hydroCHLOROthiazide 25 mg oral tablet: 1 tab(s), po, daily, # 90 tab(s), 3 Refill(s), Type: Soft Stop, Pharmacy: [id number], 1 tab(s) po daily,x90  day(s)  levothyroxine 100 mcg (0.1 mg) oral tablet: 1 tab(s), po, daily, x 90 day(s), # 90 tab(s), 3 Refill(s), Type: Physician Stop, Pharmacy: [id number], 1 tab(s) po daily,x90 day(s)  losartan 100 mg oral tablet: 1 tab(s) ( 100 mg ), PO, Daily, # 90 tab(s), 3 Refill(s), Type: Maintenance, Pharmacy: [facility] [id number], 1 tab(s) po daily   Problem list:    All Problems  Osteoporosis / SNOMED CT 662274768 / Confirmed  Macular degeneration, dry / SNOMED CT 2668080962 / Confirmed  Hypothyroidism / SNOMED CT 135691829 / Confirmed  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  Comments:  3/26/2015 9:37 AM - Luis Peyton  Left.  Colonoscopy (SNOMED CT 556268948) in 2012 at 66 Years.  Repair of rectal prolapse (SNOMED CT 53700671) on 2/6/2012 at 66 Years.  Hysterectomy (SNOMED CT 834988147).  Appendectomy (SNOMED CT 951094384).   Social History:        Alcohol Assessment            Current, social, 3 drinks/episode maximum.      Tobacco Assessment            Past      Substance Abuse Assessment            Never      Employment and Education Assessment            Retired      Home and Environment Assessment            5 children.      Exercise and Physical Activity Assessment            Exercise frequency: 5-6 times/week.  Exercise type: Walking.      Sexual Assessment            Sexually active: No.       Has no history of anemia.  Has no history of DVT or pulmonary embolism.  Has no personal history of bleeding problems.   Has no personal or family history of anesthesia reactions.  Patient does not have active tuberculosis.    S/he  has not taken aspirin or aspirin containing products in the last week.     S/he has not taken Plavix (Clopidogrel) in the last 2 weeks.    S/he has not taken warfarin in the past week.    S/he has not been on corticosteroids for more than 2 weeks recently.      S/he is not DNR before, during or after surgery.    Chest pain / SOB walking up 2 flights of steps:  no  Pain in neck or jaw:  no  CAD MI:  no  Afib:  no  Heart Failure:  on  Asthma  or Bronchitis:  no  Diabetes:  no       Insulin/Orals   Seizure Disorder:  no  CKD:  no  Thyroid Disease:  no  Liver Disease:  no  CVA:  no         Physical Examination   Vital Signs   8/20/2018 7:57 AM CDT Temperature Tympanic 97.1 DegF  LOW    Peripheral Pulse Rate 68 bpm    Pulse Site Radial artery    HR Method Manual    Systolic Blood Pressure 148 mmHg  HI    Diastolic Blood Pressure 88 mmHg  HI    Mean Arterial Pressure 108 mmHg    BP Site Right arm    BP Method Manual      Measurements from flowsheet : Measurements   8/20/2018 7:57 AM CDT Height Measured - Standard 68 in    Weight Measured - Standard 164.8 lb    BSA 1.89 m2    Body Mass Index 25.06 kg/m2  HI      General:  Alert and oriented, No acute distress.    Eye:  Pupils are equal, round and reactive to light, Extraocular movements are intact, Normal conjunctiva.    HENT:  Normocephalic, Tympanic membranes are clear, Oral mucosa is moist, No pharyngeal erythema, No sinus tenderness.    Neck:  Supple, Non-tender, No carotid bruit, No lymphadenopathy, No thyromegaly.    Respiratory:  Lungs are clear to auscultation, Respirations are non-labored, Breath sounds are equal, No chest wall tenderness.    Cardiovascular:  Normal rate, Regular rhythm, No murmur, No gallop, Normal peripheral perfusion, No edema.    Gastrointestinal:  Soft, Non-tender, No organomegaly.    Musculoskeletal:  Normal range of motion, Normal strength, No swelling, No deformity.    Integumentary:  Warm, Dry, No rash.    Neurologic:  Alert, Oriented, No  focal deficits.    Psychiatric:  Cooperative, Appropriate mood & affect.       Review / Management            Impression and Plan   Diagnosis     Preoperative clearance (HMF10-KF Z01.818).     Right cataract (HTP47-SQ H25.9).     Condition:  Stable, very low risk for cardiac or pulmonary complications.

## 2022-02-15 NOTE — TELEPHONE ENCOUNTER
Entered by Selma Mclean MA on March 31, 2020 10:13:32 AM CDT  ---------------------  From: Selma Mclean MA   To: [facility] #[id number]    Sent: 3/31/2020 10:13:32 AM CDT  Subject: Medication Management     ** Submitted: **  Order:levothyroxine (levothyroxine 100 mcg (0.1 mg) oral tablet)  1 tab(s)  Oral  daily  Qty:  90 tab(s)        Refills:  0          Substitutions Allowed     Route To [facility] #[id number]    Signed by Selma Mclean MA  3/31/2020 3:13:00 PM    ** Submitted: **  Complete:levothyroxine (levothyroxine 100 mcg (0.1 mg) oral tablet)   Signed by Selma Mclean MA  3/31/2020 3:13:00 PM    ** Not Approved:  **  levothyroxine (LEVOTHYROXINE 0.100MG (100MCG) TAB)  TAKE 1 TABLET BY MOUTH DAILY  Qty:  30 tab(s)        Days Supply:  30        Refills:  0          Substitutions Allowed     [id number]   Signed by Selma Mclean MA            ** Submitted: **  Order:hydroCHLOROthiazide (hydroCHLOROthiazide 25 mg oral tablet)  1 tab(s)  Oral  daily  Qty:  90 tab(s)        Refills:  0          Substitutions Allowed     Route To [facility] #[id number]    Signed by Selma Mclean MA  3/31/2020 3:12:00 PM    ** Submitted: **  Complete:hydroCHLOROthiazide (hydroCHLOROthiazide 25 mg oral tablet)   Signed by Selma Mclean MA  3/31/2020 3:13:00 PM    ** Not Approved:  **  hydroCHLOROthiazide (HYDROCHLOROTHIAZIDE 25MG TABLETS)  TAKE 1 TABLET BY MOUTH DAILY  Qty:  30 tab(s)        Days Supply:  30        Refills:  0          Substitutions Allowed     Route To [facility] #[id number]   Signed by Selma Mclean MA            ------------------------------------------  From: [facility] #[id number]  To: Nelson Churchill MD  Sent: March 31, 2020 3:43:35 AM CDT  Subject: Medication Management  Due: April 1, 2020 3:43:35 AM CDT    ** On Hold Pending Signature **  Drug: hydroCHLOROthiazide (hydroCHLOROthiazide 25 mg  oral tablet)  TAKE 1 TABLET BY MOUTH DAILY  Quantity: 30 tab(s)  Days Supply: 30  Refills: 0  Substitutions Allowed  Notes from Pharmacy:     Dispensed Drug: hydroCHLOROthiazide (hydroCHLOROthiazide 25 mg oral tablet)  TAKE 1 TABLET BY MOUTH DAILY  Quantity: 30 tab(s)  Days Supply: 30  Refills: 0  Substitutions Allowed  Notes from Pharmacy:     ** On Hold Pending Signature **  Drug: levothyroxine (levothyroxine 100 mcg (0.1 mg) oral tablet)  TAKE 1 TABLET BY MOUTH DAILY  Quantity: 30 tab(s)  Days Supply: 30  Refills: 0  Substitutions Allowed  Notes from Pharmacy:     Dispensed Drug: levothyroxine (levothyroxine 100 mcg (0.1 mg) oral tablet)  TAKE 1 TABLET BY MOUTH DAILY  Quantity: 30 tab(s)  Days Supply: 30  Refills: 0  Substitutions Allowed  Notes from Pharmacy:   ------------------------------------------

## 2022-02-15 NOTE — NURSING NOTE
Comprehensive Intake Entered On:  6/29/2020 10:21 AM CDT    Performed On:  6/29/2020 10:13 AM CDT by Selma Mclean MA               Summary   Chief Complaint :   c/o woodtick bite since Saturday.  noticed increased redness last night.   Weight Measured :   169.8 lb(Converted to: 169 lb 13 oz, 77.02 kg)    Height Measured :   68 in(Converted to: 5 ft 8 in, 172.72 cm)    Body Mass Index :   25.82 kg/m2 (HI)    Body Surface Area :   1.92 m2   Systolic Blood Pressure :   148 mmHg (HI)    Diastolic Blood Pressure :   88 mmHg   Mean Arterial Pressure :   108 mmHg   Peripheral Pulse Rate :   72 bpm   BP Site :   Left arm   Pulse Site :   Radial artery   BP Method :   Manual   HR Method :   Manual   Temperature Tympanic :   98.4 DegF(Converted to: 36.9 DegC)    Selma Mclean MA - 6/29/2020 10:13 AM CDT   Health Status   Allergies Verified? :   Yes   Medication History Verified? :   Yes   Immunizations Current :   Yes   Medical History Verified? :   Yes   Pre-Visit Planning Status :   Not completed   Tobacco Use? :   Former smoker   Selma Mclean MA - 6/29/2020 10:13 AM CDT   Consents   Consent for Immunization Exchange :   Consent Granted   Consent for Immunizations to Providers :   Consent Granted   Selma Mclean MA - 6/29/2020 10:13 AM CDT   Meds / Allergies   (As Of: 6/29/2020 10:21:05 AM CDT)   Allergies (Active)   alendronate  Estimated Onset Date:   Unspecified ; Reactions:   itchiness ; Created By:   Selma Mclean MA; Reaction Status:   Active ; Category:   Drug ; Substance:   alendronate ; Type:   Allergy ; Updated By:   Selma Mclean MA; Source:   Patient ; Reviewed Date:   2/28/2019 10:25 AM CST        Medication List   (As Of: 6/29/2020 10:21:05 AM CDT)   Prescription/Discharge Order    atorvastatin  :   atorvastatin ; Status:   Prescribed ; Ordered As Mnemonic:   atorvastatin 10 mg oral tablet ; Simple Display Line:   1 tab(s), Oral, qhs, 90 tab(s), 0 Refill(s) ; Ordering Provider:   Kerline SYED,  Nelson; Catalog Code:   atorvastatin ; Order Dt/Tm:   6/1/2020 2:11:38 PM CDT          hydroCHLOROthiazide  :   hydroCHLOROthiazide ; Status:   Prescribed ; Ordered As Mnemonic:   hydroCHLOROthiazide 25 mg oral tablet ; Simple Display Line:   1 tab(s), Oral, daily, 90 tab(s), 0 Refill(s) ; Ordering Provider:   Nelson Churchill MD; Catalog Code:   hydroCHLOROthiazide ; Order Dt/Tm:   3/31/2020 10:12:57 AM CDT          levothyroxine  :   levothyroxine ; Status:   Prescribed ; Ordered As Mnemonic:   levothyroxine 100 mcg (0.1 mg) oral tablet ; Simple Display Line:   1 tab(s), Oral, daily, 90 tab(s), 0 Refill(s) ; Ordering Provider:   Nelson Churchill MD; Catalog Code:   levothyroxine ; Order Dt/Tm:   3/31/2020 10:13:15 AM CDT          losartan  :   losartan ; Status:   Prescribed ; Ordered As Mnemonic:   losartan 100 mg oral tablet ; Simple Display Line:   1 tab(s), Oral, daily, 90 tab(s), 0 Refill(s) ; Ordering Provider:   Nelson Churchill MD; Catalog Code:   losartan ; Order Dt/Tm:   6/1/2020 2:12:38 PM CDT            ID Risk Screen   Recent Travel History :   No recent travel   Family Member Travel History :   No recent travel   Other Exposure to Infectious Disease :   Unknown   Vinay WHIPPLE, Selma - 6/29/2020 10:13 AM CDT

## 2022-02-15 NOTE — LETTER
(Inserted Image. Unable to display)   [address] [place], [place]  [id number], 2021      MARIAJOSE FAJARDO  [address] [place] [address]        Dear MARIAJOSE,      Thank you for selecting [facility] for your healthcare needs.       Your bone density results indicate:  _   Normal bone density  _   Osteopenia in hip/spine (mild bone thinning   not osteoporosis)  x   Osteoporosis in hip/spine  Our recommendation is:  _   Schedule an appointment with your health care provider to discuss your results.     x   Continue current regimen    x   Calcium    Recommended daily amounts for men and women are:  o Men age 50 - 69  1000 mg  o Men age 70+  1200 mg  o Women age 50+  1200 mg  Vitamin D    800 - 1000 IU daily  Weight bearing Exercise    30 minutes or more several times a week  Avoid excess alcohol and smoking  Prevent falling    Avoid excessive sedation or hypotension (low blood pressure)    Improve strength    Decrease or remove environmental hazards    x   Repeat bone density in 2 year(s)    Your bone density is stable.  No changes in your medication is needed.            Please contact me or my assistant at [phone] if you have any questions or concerns.     Sincerely,        Nelson Churchill MD

## 2022-02-15 NOTE — TELEPHONE ENCOUNTER
Entered by Selma Mclean MA on July 29, 2020 11:01:55 AM CDT  ---------------------  From: Selma Mclean MA   To: [facility] #[id number]    Sent: 7/29/2020 11:01:54 AM CDT  Subject: Medication Management     ** Submitted: **  Order:levothyroxine (levothyroxine 100 mcg (0.1 mg) oral tablet)  1 tab(s)  Oral  daily  Qty:  30 tab(s)        Days Supply:  30        Refills:  0          Substitutions Allowed     Route To [facility] #[id number]    Signed by Selma Mclean MA  7/29/2020 4:01:00 PM [facility]    ** Submitted: **  Complete:levothyroxine (levothyroxine 100 mcg (0.1 mg) oral tablet)   Signed by Selma Mclean MA  7/29/2020 4:01:00 PM [facility]    ** Not Approved:  **  levothyroxine (LEVOTHYROXINE 0.100MG (100MCG) TAB)  TAKE 1 TABLET BY MOUTH DAILY  Qty:  30 tab(s)        Days Supply:  30        Refills:  0          Substitutions Allowed     Route To [facility] #[id number]   Signed by Selma Mclean MA            ** Submitted: **  Order:hydroCHLOROthiazide (hydroCHLOROthiazide 25 mg oral tablet)  1 tab(s)  Oral  daily  Qty:  30 tab(s)        Days Supply:  30        Refills:  0          Substitutions Allowed     Route To [facility] #[id number]    Signed by Selma Mclean MA  7/29/2020 3:59:00 PM [facility]    ** Submitted: **  Complete:hydroCHLOROthiazide (hydroCHLOROthiazide 25 mg oral tablet)   Signed by Selma Mclean MA  7/29/2020 4:01:00 PM [facility]    ** Not Approved:  **  hydroCHLOROthiazide (HYDROCHLOROTHIAZIDE 25MG TABLETS)  TAKE 1 TABLET BY MOUTH DAILY  Qty:  30 tab(s)        Days Supply:  30        Refills:  0          Substitutions Allowed     Route To [facility] #[id number]   Signed by Selma Mclean MA            ------------------------------------------  From: [facility] #[id number]  To: Nelson Churchill MD  Sent: July 28, 2020 3:44:06 AM CDT  Subject: Medication Management  Due: July 28, 2020 10:29:52 PM CDT     ** On Hold Pending  Signature **     Dispensed Drug: hydroCHLOROthiazide (hydroCHLOROthiazide 25 mg oral tablet), TAKE 1 TABLET BY MOUTH DAILY  Quantity: 30 tab(s)  Days Supply: 30  Refills: 0  Substitutions Allowed  Notes from Pharmacy:     ** On Hold Pending Signature **     Dispensed Drug: levothyroxine (levothyroxine 100 mcg (0.1 mg) oral tablet), TAKE 1 TABLET BY MOUTH DAILY  Quantity: 30 tab(s)  Days Supply: 30  Refills: 0  Substitutions Allowed  Notes from Pharmacy:  ------------------------------------------Called/LM for pt to schedule AWV w/ [facility] and that 30 day supply will be sent in.

## 2022-02-15 NOTE — TELEPHONE ENCOUNTER
Entered by Selma Mclean MA on August 31, 2020 11:30:02 AM CDT  ---------------------  From: Selma Mclean MA   To: [facility] #[id number]    Sent: 8/31/2020 11:30:02 AM CDT  Subject: Medication Management     ** Submitted: **  Order:losartan (losartan 100 mg oral tablet)  1 tab(s)  Oral  daily  Qty:  30 tab(s)        Refills:  0          Substitutions Allowed     Route To [facility] #[id number]    Signed by Selma Mclean MA  8/31/2020 4:29:00 PM [facility]    ** Submitted: **  Complete:losartan (losartan 100 mg oral tablet)   Signed by Selma Mclean MA  8/31/2020 4:30:00 PM [facility]    ** Not Approved:  **  losartan (LOSARTAN 100MG TABLETS)  TAKE 1 TABLET BY MOUTH DAILY  Qty:  90 tab(s)        Days Supply:  90        Refills:  0          Substitutions Allowed     Route To [facility] #[id number]   Signed by Selma Mclean MA            ** Submitted: **  Order:atorvastatin (atorvastatin 10 mg oral tablet)  1 tab(s)  Oral  qhs  Qty:  30 tab(s)        Refills:  0          Substitutions Allowed     Route To [facility] #[id number]    Signed by Selma Mclean MA  8/31/2020 4:28:00 PM [facility]    ** Submitted: **  Complete:atorvastatin (atorvastatin 10 mg oral tablet)   Signed by Selma Mclean MA  8/31/2020 4:29:00 PM [facility]    ** Not Approved:  **  atorvastatin (ATORVASTATIN 10MG TABLETS)  TAKE 1 TABLET BY MOUTH EVERY NIGHT AT BEDTIME  Qty:  90 tab(s)        Days Supply:  90        Refills:  0          Substitutions Allowed     Route To [facility] #[id number]   Signed by Selma Mclean MA            ------------------------------------------  From: [facility] #[id number]  To: Nelson Churchill MD  Sent: August 29, 2020 3:43:42 AM CDT  Subject: Medication Management  Due: August 28, 2020 8:19:25 PM CDT     ** On Hold Pending Signature **     Dispensed Drug: atorvastatin (atorvastatin 10 mg oral tablet), TAKE 1 TABLET BY MOUTH EVERY NIGHT AT  BEDTIME  Quantity: 90 tab(s)  Days Supply: 90  Refills: 0  Substitutions Allowed  Notes from Pharmacy:     ** On Hold Pending Signature **     Dispensed Drug: losartan (losartan 100 mg oral tablet), TAKE 1 TABLET BY MOUTH DAILY  Quantity: 90 tab(s)  Days Supply: 90  Refills: 0  Substitutions Allowed  Notes from Pharmacy:  ------------------------------------------Med Refill      Date of last office visit and reason:  8/26/2020 w/ [facility] for diarrhea      Date of last Med Check / Px:   3/13/19 w/ [facility] for px  Date of last labs pertaining to med:  _    Note:  _    RTC order in chart:  RTC past due    For Protocol refill, has patient been contacted:  message sent to pharmacy

## 2022-02-15 NOTE — TELEPHONE ENCOUNTER
---------------------  From: Danika Flood LPN   To: Referral Coordinators Pool ([id number]_[facility]);     Sent: 9/20/2021 1:31:35 PM CDT  Subject: General Message     Dexa ordered

## 2022-02-15 NOTE — PROGRESS NOTES
Patient:   MARIAJOSE FAJARDO            MRN: [id number]            FIN: [id number]               Age:   73 years     Sex:  Female     :  1945   Associated Diagnoses:   Acute foreign body of left ear canal   Author:   Sj SYED, Alphonse      Procedure   Ear foreign body removal procedure   Date/ Time:  2019 12:02:00 PM.     Confirmed: patient.     Performed by: self.     Informed consent: Verbally obtained.     Indication: hearing disturbance, Foreign body left ear canal, piece of hearing aid.     Location: left ear.     Preparation and technique: positioned sitting upright, method including (cerumen loop, curette, irrigation with warm tap water, otologic syringe).     Results: foreign body removal complete.     Procedure tolerated: well.     No Complications.        Impression and Plan   Diagnosis     Acute foreign body of left ear canal (BCK44-DV T16.2XXA).     Orders     F/U  if not improving, sooner if getting worse.

## 2022-02-15 NOTE — TELEPHONE ENCOUNTER
---------------------  From: Danika Flood LPN   Sent: 3/3/2020 9:39:57 AM CST  Subject: General Message     Patient requested a 30 day refill for medication, Has an appt on 03/25/2020 but will be out of town for a few weeks and will run out of medication before appt. #30 sent into pharmacy per protocol.

## 2022-02-15 NOTE — PROGRESS NOTES
Patient:   MARIAJOSE FAJARDO            MRN: [id number]            FIN: [id number]               Age:   72 years     Sex:  Female     :  1945   Associated Diagnoses:   Medicare annual wellness visit, subsequent; Essential hypertension; Hypothyroidism; Lipid metabolism disorder; Osteoporosis   Author:   Nelson Churchill MD      Visit Information      Date of Service: 2018 09:47 am  Performing Location: [facility]  Encounter#: [id number]      Primary Care Provider (PCP):  Nelson Churchill MD    NPI# [id number]   Visit type:  Medicare, annual wellness visit.    Accompanied by:  No one.    Source of history:  Self.    History limitation:  None.       Chief Complaint   3/12/2018 10:04 AM CDT   AWV, refill meds but HCTZ.      Well Adult History   Well Adult History             The patient presents for Medicare well exam.  The patient's general health status is described as good.  The patient's diet is described as balanced.  Exercise: routine, 5  times per week.  Associated symptoms consist of none.  Last menstrual period: menopausal.  Medical encounters:.  Complaint:.  Additional pertinent history: daily caffeine use, tobacco use none and occasional alcohol use.       mammogram:  due  Pap smear:  n/a  colonoscopy: due  lipid and diabetes screening:  today  immunizations:  up to date   other:  hypertension, hyperlipidemia and hypothyroidism controlled, no side effects from medications      Review of Systems   Constitutional:  No fever, No chills, No sweats, No weakness, No fatigue.    Eye:  No recent visual problem.    Ear/Nose/Mouth/Throat:  No decreased hearing, No nasal congestion, No sore throat.    Respiratory:  No shortness of breath, No cough.    Cardiovascular:  Negative, No chest pain, No palpitations, No peripheral edema.    Gastrointestinal:  No nausea, No vomiting, No diarrhea, No constipation, No heartburn.    Genitourinary:  No dysuria, No change in urine stream.     Hematology/Lymphatics:  No bruising tendency, No bleeding tendency.    Endocrine:  No cold intolerance, No heat intolerance.    Immunologic:  Negative.    Musculoskeletal:  No back pain, No neck pain, No joint pain, No muscle pain.    Integumentary:  Rash, No dryness, No skin lesion.    Neurologic:  Alert and oriented X4,   Cognitive impairment:  mild memory   Year: ok  Date: ok  City: ok.    Psychiatric:  No anxiety, No depression.    GDS and CAGE reviewed and discussed with patient.      Hearing impairment:  hearing aids  ADL: independent  Fall risk:  low  Home safety:  OK    Advanced care planning:  completed      Health Status   Allergies:    Allergic Reactions (Selected)  No known allergies   Medications:  (Selected)   Prescriptions  Prescribed  atorvastatin 10 mg oral tablet: 1 tab(s), po, hs, x 90 day(s), # 90 tab(s), 0 Refill(s), Type: Physician Stop, Pharmacy: [id number], 1 tab(s) po hs,x90 day(s)  hydroCHLOROthiazide 25 mg oral tablet: 1 tab(s), po, daily, # 90 tab(s), 0 Refill(s), Type: Soft Stop, Pharmacy: [id number], 1 tab(s) po daily  levothyroxine 100 mcg (0.1 mg) oral tablet: 1 tab(s), po, daily, x 90 day(s), # 90 tab(s), 0 Refill(s), Type: Physician Stop, Pharmacy: [facility] [id number], 1 tab(s) po daily,x90 day(s)  losartan 100 mg oral tablet: 1 tab(s) ( 100 mg ), PO, Daily, # 90 tab(s), 2 Refill(s), Type: Maintenance, Pharmacy: [id number], 1 tab(s) po daily   Problem list:    All Problems  Hypothyroidism / SNOMED CT 703579537 / Confirmed  Essential hypertension / SNOMED CT 85249882 / Confirmed  Macular degeneration, dry / SNOMED CT 6946678467 / Confirmed  Lipid metabolism disorder / SNOMED CT 620602409 / Confirmed  Osteoporosis / SNOMED CT 628105142 / Confirmed     Other Healthcare:         Histories   Past Medical History:    Active  Essential hypertension (29902125)  Hypothyroidism (220795606)  Lipid metabolism disorder (393972253)   Family  History:       Procedure history:    Cataract extraction and insertion of intraocular lens (SNOMED CT 2972686681) on 3/19/2015 at 69 Years.  Comments:  3/26/2015 9:37 AM - Peyton Smith  Left.  Repair of rectal prolapse (SNOMED CT 24801523) on 2/6/2012 at 66 Years.  Hysterectomy (SNOMED CT 189119002).  Appendectomy (SNOMED CT 165268089).   Social History:        Alcohol Assessment            Current, social, 3 drinks/episode maximum.      Tobacco Assessment            Past      Substance Abuse Assessment            Never      Employment and Education Assessment            Retired      Home and Environment Assessment            5 children.      Exercise and Physical Activity Assessment            Exercise frequency: 5-6 times/week.  Exercise type: Walking.      Sexual Assessment            Sexually active: No.        Physical Examination   Vital Signs   3/12/2018 10:04 AM CDT Temperature Tympanic 97.3 DegF  LOW    Peripheral Pulse Rate 64 bpm    Pulse Site Radial artery    HR Method Manual    Systolic Blood Pressure 130 mmHg    Diastolic Blood Pressure 88 mmHg  HI    Mean Arterial Pressure 102 mmHg    BP Site Right arm    BP Method Manual      Measurements from flowsheet : Measurements   3/12/2018 10:04 AM CDT Height Measured - Standard 68 in    Weight Measured - Standard 168.8 lb    BSA 1.91 m2    Body Mass Index 25.66 kg/m2  HI      General:  Alert and oriented, No acute distress.    Eye:  Pupils are equal, round and reactive to light, Extraocular movements are intact, Normal conjunctiva.    HENT:  Normocephalic, Tympanic membranes are clear, Oral mucosa is moist, No pharyngeal erythema, No sinus tenderness.    Neck:  Supple, Non-tender, No carotid bruit, No lymphadenopathy, No thyromegaly.    Respiratory:  Lungs are clear to auscultation, Respirations are non-labored, Breath sounds are equal, No chest wall tenderness.    Cardiovascular:  Normal rate, Regular rhythm, No murmur, No gallop, Normal peripheral  perfusion, No edema.    Gastrointestinal:  Soft, Non-tender, No organomegaly.    Musculoskeletal:  Normal range of motion, Normal strength, No swelling, No deformity.    Integumentary:  Warm, Dry, No rash.    Neurologic:  Alert, Oriented, No focal deficits.    Psychiatric:  Cooperative, Appropriate mood & affect.       Review / Management   Results review      Impression and Plan   Diagnosis     Medicare annual wellness visit, subsequent (QRU75-LH Z09).     Essential hypertension (WQP23-QE I10).     Hypothyroidism (EVW28-US E03.4).     Lipid metabolism disorder (APJ53-LF E78.2).     Osteoporosis (CDV90-BS M81.0).     Course:  Progressing as expected.    Plan:  Counseled on health maintenance, diet, activity, BMI discussed, continue current medication, DEXA ordered.    Patient Instructions:       Counseled: Regarding diagnosis, Regarding medications, Smoking cessation, Verbalized understanding, Breast cancer, colon cancer, diabetes, lipid, HTN, obesity screening discussed and initiated, Risk factors for development of chronic disease and infirmities of ageing have been discussed and plans for minimizing and screening for these conditions have been discussed.  Plans in place for management of conditions identified.  The preventative screening checklist has been completed and reviewed with the patient and a copy has been filled in the electronic record..

## 2022-02-15 NOTE — PROGRESS NOTES
Patient:   MARIAJOSE FAJARDO            MRN: [id number]            FIN: [id number]               Age:   71 years     Sex:  Female     :  1945   Associated Diagnoses:   Hypertension   Author:   Nelson Churchill MD      Visit Information      Date of Service: 10/05/2017 01:15 pm  Performing Location: [facility]  Encounter#: [id number]      Primary Care Provider (PCP):  Nelson Churchill MD    NPI# [id number]   Visit type:  On-going care, Scheduled follow-up.         Medication: Follow-up, Refill.    Source of history:  Self, Medical record.    History limitation:  None.       Chief Complaint   10/5/2017 1:46 PM CDT    f/u BP--was rx HCTZ in August.        History of Present Illness             The patient presents for follow-up evaluation of hypertension.  The quality of hypertension symptom(s) since the patient's last visit is described as being unchanged.  The severity of the hypertension symptom(s) since the last visit is mild.  Since the patient's last visit, the timing/course of hypertension symptom(s) remains unchanged.  The context of the hypertension: blood pressure was maintained within the target range.  Exacerbating factors consist of noncompliance with diet.  Relieving factors consist of medication.  Associated symptoms consist of none.  Prior treatment consists of none.  Medical encounters: none.  Compliance problems: none.  Additional pertinent history: previous labs reviewed.        Review of Systems   Constitutional:  No fever, No chills, No sweats, No weakness, No fatigue.    Eye:  No recent visual problem.    Ear/Nose/Mouth/Throat:  No decreased hearing, No nasal congestion, No sore throat.    Respiratory:  No shortness of breath, No cough.    Cardiovascular:  Negative, No chest pain, No palpitations, No peripheral edema.    Gastrointestinal:  No nausea, No vomiting, No diarrhea, No constipation, No heartburn.    Genitourinary:  No dysuria, No change in urine stream.     Hematology/Lymphatics:  No bruising tendency, No bleeding tendency.    Endocrine:  No cold intolerance, No heat intolerance.    Immunologic:  Negative.    Musculoskeletal:  No back pain, No neck pain, No joint pain, No muscle pain.    Integumentary:  No rash, No dryness, No skin lesion.    Neurologic:  Alert and oriented X4, No headache.    Psychiatric:  No anxiety, No depression.       Health Status   Allergies:    Allergic Reactions (Selected)  No known allergies   Medications:  (Selected)   Prescriptions  Prescribed  atorvastatin 10 mg oral tablet: 1 tab(s), po, hs, x 90 day(s), # 90 tab(s), 2 Refill(s), Type: Physician Stop, Pharmacy: [id number], 1 tab(s) po hs  hydroCHLOROthiazide 25 mg oral tablet: 1 tab(s), po, daily, # 90 tab(s), 0 Refill(s), Type: Soft Stop, Pharmacy: [facility] [id number], 1 tab(s) po daily  levothyroxine 100 mcg (0.1 mg) oral tablet: 1 tab(s), po, daily, x 90 day(s), # 90 tab(s), 2 Refill(s), Type: Physician Stop, Pharmacy: [facility] [address]95, 1 tab(s) po daily  losartan 100 mg oral tablet: 1 tab(s) ( 100 mg ), PO, Daily, # 90 tab(s), 2 Refill(s), Type: Maintenance, Pharmacy: [facility] [id number], 1 tab(s) po daily   Problem list:    All Problems  Hypothyroidism / SNOMED CT 560495756 / Confirmed  Essential hypertension / SNOMED CT 24922753 / Confirmed  Macular degeneration, dry / SNOMED CT 3260692279 / Confirmed  Lipid metabolism disorder / SNOMED CT 245228313 / Confirmed      Histories   Past Medical History:    Active  Essential hypertension (60574167)  Hypothyroidism (183498208)  Lipid metabolism disorder (522730284)   Family History:       Procedure history:    Cataract extraction and insertion of intraocular lens (SNOMED CT 7135303195) on 3/19/2015 at 69 Years.  Comments:  3/26/2015 9:37 AM - Peyton Smith  Left.  Repair of rectal prolapse (SNOMED CT 92635923) on 2/6/2012 at 66 Years.  Hysterectomy (SNOMED CT 740111739).  Appendectomy (SNOMED CT  641971684).   Social History:        Alcohol Assessment            Current, social, 3 drinks/episode maximum.      Tobacco Assessment            Past      Substance Abuse Assessment            Never      Employment and Education Assessment            Retired      Home and Environment Assessment            5 children.      Exercise and Physical Activity Assessment            Exercise frequency: Daily.  Exercise type: Walking.      Sexual Assessment            Sexually active: No.        Physical Examination   Vital Signs   10/5/2017 1:46 PM CDT Temperature Tympanic 98 DegF    Peripheral Pulse Rate 76 bpm    Pulse Site Radial artery    HR Method Manual    Systolic Blood Pressure 132 mmHg    Diastolic Blood Pressure 80 mmHg    Mean Arterial Pressure 97 mmHg    BP Site Left arm    BP Method Manual      Measurements from flowsheet : Measurements   10/5/2017 1:46 PM CDT Height Measured - Standard 68 in    Weight Measured - Standard 167.2 lb    BSA 1.91 m2    Body Mass Index 25.42 kg/m2      Eye:  Normal conjunctiva.    HENT:  Normocephalic, Oral mucosa is moist, No pharyngeal erythema.    Neck:  Supple, Non-tender, No carotid bruit, No lymphadenopathy, No thyromegaly.    Respiratory:  Lungs are clear to auscultation, Respirations are non-labored.    Cardiovascular:  Normal rate, Regular rhythm, Normal peripheral perfusion, No edema.    Gastrointestinal:  Soft, Non-tender.    Musculoskeletal:  Normal range of motion.    Integumentary:  Warm, Dry.    Neurologic:  Alert, Oriented.    Psychiatric:  Cooperative, Appropriate mood & affect.       Review / Management   Results review:  Lab results   8/14/2017 8:24 AM CDT Sodium Level 142 mmol/L     Potassium Level 3.8 mmol/L     Chloride Level 104 mmol/L     CO2 Level 29 mmol/L     Glucose Level 108 mg/dL  HI     BUN 13 mg/dL     Creatinine 0.90 mg/dL     BUN/Creat Ratio NOT APPLICABLE     eGFR 64 mL/min/1.73m2     eGFR African American 74 mL/min/1.73m2     Calcium Level 9.5  mg/dL    7/17/2017 8:03 AM CDT Sodium Level 142 mmol/L (Modified)    Potassium Level 4.4 mmol/L (Modified)    Chloride Level 108 mmol/L (Modified)    CO2 Level 24 mmol/L (Modified)    Glucose Level 134 mg/dL  HI (Modified)    BUN 12 mg/dL (Modified)    Creatinine 0.82 mg/dL (Modified)    BUN/Creat Ratio NOT APPLICABLE (Modified)    eGFR 71 mL/min/1.73m2 (Modified)    eGFR African American 83 mL/min/1.73m2 (Modified)    Calcium Level 9.4 mg/dL (Modified)   .       Impression and Plan   Diagnosis     Hypertension (YKX25-MT I10).     Course:  Progressing as expected, Well controlled.    Plan:  Enroll in exercise program, Monitor blood pressure, Weight monitoring, continue current medication.         Diet: Low cholesterol, Sodium restricted.    Patient Instructions:       Counseled: Regarding treatment ( Hypertension ( Diet management, Medication management, Physical activity, Weight management ) ).

## 2022-02-15 NOTE — NURSING NOTE
Quick Intake Entered On:  7/30/2019 10:08 AM CDT    Performed On:  7/30/2019 9:58 AM CDT by Sarah Moulton RN               Summary   Chief Complaint :   BP   Systolic Blood Pressure :   136 mmHg (HI)    Diastolic Blood Pressure :   86 mmHg (HI)    Mean Arterial Pressure :   103 mmHg   Vital Signs Comments :   Patient happy with result   BP Site :   Right arm   BP Method :   Manual   Sarah Moulton RN - 7/30/2019 10:08 AM CDT

## 2022-02-15 NOTE — PROGRESS NOTES
Patient:   MARIAJOSE FAJARDO            MRN: [id number]            FIN: [id number]               Age:   71 years     Sex:  Female     :  1945   Associated Diagnoses:   None   Author:   Nelson Churchill MD      Ambulatory Blood Pressure Report  Date:  2017  Medications:  losartan 100 mg    Period    Samples  Mean Sys       Mean Ana Maria  Mean HR BP  Load Sys % BP Load Ana Maria %  Overall:  14:53-14:18  52  145  83  68  65  27            Awake:  1355-6893   45  147  85  69  64  29  Asleep:  6346-8945   7  138  72  63  71  14    Impression:  Hypertension, non dipping while sleeping    Recommendations: continue to monitor HTN, if not improving with increased dose of losartan add diuretic

## 2022-02-15 NOTE — NURSING NOTE
Medicare Visit Entered On:  3/13/2019 8:16 AM CDT    Performed On:  3/13/2019 8:09 AM CDT by Vinay WHIPPLE, [place]               Summary   Chief Complaint :   AWV.  has been checking BP at home.   Weight Measured :   168 lb(Converted to: 168 lb 0 oz, 76.20 kg)    Height Measured :   68 in(Converted to: 5 ft 8 in, 172.72 cm)    Body Mass Index :   25.54 kg/m2 (HI)    Body Surface Area :   1.91 m2   Systolic Blood Pressure :   152 mmHg (HI)    Diastolic Blood Pressure :   88 mmHg (HI)    Mean Arterial Pressure :   109 mmHg   Peripheral Pulse Rate :   68 bpm   BP Site :   Right arm   Pulse Site :   Radial artery   BP Method :   Manual   HR Method :   Manual   Temperature Tympanic :   97.8 DegF(Converted to: 36.6 DegC)  (LOW)    Selma Mclean MA - 3/13/2019 8:09 AM CDT   Health Status   Allergies Verified? :   Yes   Medication History Verified? :   Yes   Immunizations Current :   Yes   Medical History Verified? :   Yes   Pre-Visit Planning Status :   Completed   Tobacco Use? :   Former smoker   Selma Mclean MA - 3/13/2019 8:09 AM CDT   Demographics   Last Name :   SCOTTY   Address :   [address] [place] [address]   First Name :   MARIAJOSE   Yu Initial :   A   Responsible Party Date of Birth () :   1945 CST   City :   [place]   State :   [place]   Zip Code :   [address]   Selma Mclean MA - 3/13/2019 8:09 AM CDT   Providers Grid   Provider Name :    Dr. Hernandez              Provider Specialty :    Optometrist                Selma Mclean MA - 3/13/2019 8:09 AM CDT         Ancillary Services Grid   Name :    Pauly              Type of Service :    Pharmacy                [place]ma Mclean MA - 3/13/2019 8:09 AM CDT         Consents   Consent for Immunization Exchange :   Consent Granted   Consent for Immunizations to Providers :   Consent Granted   Selma Mclean MA 3/13/2019 8:09 AM CDT   Meds / Allergies   (As Of: 3/13/2019 8:16:17 AM CDT)   Allergies (Active)   alendronate  Estimated Onset Date:   Unspecified ;  Reactions:   itchiness ; Created By:   [place]ma Mclean MA; Reaction Status:   Active ; Category:   Drug ; Substance:   alendronate ; Type:   Allergy ; Updated By:   [place] Mclean MA; Source:   Patient ; Reviewed Date:   2/28/2019 10:25 AM [facility]        Medication List   (As Of: 3/13/2019 8:16:17 AM CDT)   Prescription/Discharge Order    hydroCHLOROthiazide  :   hydroCHLOROthiazide ; Status:   Prescribed ; Ordered As Mnemonic:   hydroCHLOROthiazide 25 mg oral tablet ; Simple Display Line:   1 tab(s), po, daily, for 90 day(s), 90 tab(s), 3 Refill(s) ; Ordering Provider:   Nelson Churchill MD; Catalog Code:   hydroCHLOROthiazide ; Order Dt/Tm:   5/16/2018 4:45:26 PM          losartan  :   losartan ; Status:   Prescribed ; Ordered As Mnemonic:   losartan 100 mg oral tablet ; Simple Display Line:   100 mg, 1 tab(s), PO, Daily, 90 tab(s), 3 Refill(s) ; Ordering Provider:   Nelson Churchill MD; Catalog Code:   losartan ; Order Dt/Tm:   3/12/2018 12:27:29 PM            Social History   Social History   (As Of: 3/13/2019 8:16:17 AM CDT)   Alcohol:        Current, social, 3 drinks/episode maximum.   (Last Updated: 3/18/2015 3:13:06 PM CDT by Maria Fernanda Jo CMA)          Tobacco:        Past   (Last Updated: 3/18/2015 3:12:45 PM CDT by Maria Fernanda Jo CMA)          Substance Abuse:        Never   (Last Updated: 3/18/2015 3:13:12 PM CDT by Maria Fernanda Jo CMA)          Employment and Education:        Retired   (Last Updated: 3/18/2015 3:12:23 PM CDT by Maria Fernanda Jo CMA)          Home and Environment:        5 children.   (Last Updated: 3/18/2015 3:13:54 PM CDT by Maria Fernanda Jo CMA)          Exercise and Physical Activity:        Exercise frequency: 5-6 times/week.  Exercise type: Walking.   (Last Updated: 3/12/2018 10:06:31 AM CDT by [place] Mclean MA)          Sexual:        Sexually active: No.   (Last Updated: 3/18/2015 3:12:39 PM CDT by Maria Fernanda Jo CMA)            Geriatric Depression Screening   Geriatric  Depression Satisfied Life :   Yes   Geriatric Depression Dropped Activities :   No   Geriatric Depression Life Empty :   No   Geriatric Depression Bored :   No   Geriatric Depression Good Spirits :   Yes   Geriatric Depression Afraid Bad Things :   No   Geriatric Depression Feel Happy :   Yes   Geriatric Depression Feel Helpless :   No   Geriatric Depression Prefer to Stay Home :   No   Geriatric Depression Memory Problems :   No   Geriatric Depression Wonderful Be Alive :   Yes   Geriatric Depression Feel Worthless :   No   Geriatric Depression Situation Hopeless :   No   Geriatric Depression Others Better Off :   No   Geriatric Depression Full of Energy :   Yes   Geriatric Depression Total Score :   0    [place]ma Mclean MA 3/13/2019 8:09 AM CDT   Hearing and Vision Screening   Hearing Screen Comments :   Pt wears bilateral hearing aids   Selma Mclean MA 3/13/2019 8:17 AM CDT   Home Safety Screen   Emergency Numbers Kept/Updated :   Yes   Aware of Smoking Dangers :   Yes   Smoke Alarms/Fire Extinguisher Available :   Yes   Household Members Fire Safety Knowledge :   Yes   Floor Rugs Removed or Fastened :   Yes   Mats in Bathtub/Shower :   Yes   Outdoor Clutter Safety :   Yes   Indoor Clutter Safety :   Yes   Electrical Cord Safety :   Yes   Selma Mclean MA 3/13/2019 8:09 AM CDT   [place] Fall Risk   History of Fall in Last 3 Months [place] :   No   Selma Mclean MA 3/13/2019 8:09 AM CDT   Functional Assessment   Focused Functional Assessment Grid   Bathing :   Independent (2)   Dressing :   Independent (2)   Toileting :   Independent (2)   Transferring Bed or Chair :   Independent (2)   Feeding :   Independent (2)   Selma Mclean MA 3/13/2019 8:09 AM CDT   Capable of Shopping :   Yes   Capable of Walking :   Yes   Capable of Housekeeping :   Yes   Capable of Managing Medications :   Yes   Capable of Handling Finances :   Yes   Selma Mclean MA 3/13/2019 8:09 AM CDT

## 2022-02-15 NOTE — NURSING NOTE
Medicare Visit Entered On:  9/20/2021 11:34 AM CDT    Performed On:  9/20/2021 11:22 AM CDT by Danika Flood LPN               Summary   Chief Complaint :   AWV-medicare   Advance Directive :   Yes   Weight Measured :   168 lb(Converted to: 168 lb 0 oz, 76.204 kg)    Height Measured :   68 in(Converted to: 5 ft 8 in, 172.72 cm)    Body Mass Index :   25.54 kg/m2 (HI)    Body Surface Area :   1.91 m2   Systolic Blood Pressure :   166 mmHg (HI)    Diastolic Blood Pressure :   94 mmHg (HI)    Mean Arterial Pressure :   118 mmHg   Peripheral Pulse Rate :   72 bpm   BP Site :   Right arm   Pulse Site :   Radial artery   BP Method :   Manual   HR Method :   Manual   Temperature Tympanic :   97.6 DegF(Converted to: 36.4 DegC)  (LOW)    Danika Flood LPN - 9/20/2021 11:22 AM CDT   Health Status   Allergies Verified? :   Yes   Medication History Verified? :   Yes   Immunizations Current :   Yes   Pre-Visit Planning Status :   Completed   Tobacco Use? :   Former smoker   Danika Flood LPN - 9/20/2021 11:22 AM CDT   Consents   Consent for Immunization Exchange :   Consent Granted   Consent for Immunizations to Providers :   Consent Granted   Danika Flood LPN - 9/20/2021 11:22 AM CDT   Meds / Allergies   (As Of: 9/20/2021 11:34:12 AM CDT)   Allergies (Active)   alendronate  Estimated Onset Date:   Unspecified ; Reactions:   itchiness ; Created By:   Selma Mclean MA; Reaction Status:   Active ; Category:   Drug ; Substance:   alendronate ; Type:   Allergy ; Updated By:   Selma Mclean MA; Source:   Patient ; Reviewed Date:   9/20/2021 11:28 AM CDT        Medication List   (As Of: 9/20/2021 11:34:12 AM CDT)   Prescription/Discharge Order    atorvastatin  :   atorvastatin ; Status:   Prescribed ; Ordered As Mnemonic:   atorvastatin 10 mg oral tablet ; Simple Display Line:   10 mg, 1 tab(s), Oral, qhs, for 90 day(s), 90 tab(s), 3 Refill(s) ; Ordering Provider:   Nelson Churchill MD; Catalog Code:    atorvastatin ; Order Dt/Tm:   9/16/2020 8:37:57 AM CDT          hydroCHLOROthiazide  :   hydroCHLOROthiazide ; Status:   Prescribed ; Ordered As Mnemonic:   hydroCHLOROthiazide 25 mg oral tablet ; Simple Display Line:   1 tab(s), Oral, daily, 90 tab(s), 3 Refill(s) ; Ordering Provider:   Nelson Churchill MD; Catalog Code:   hydroCHLOROthiazide ; Order Dt/Tm:   9/16/2020 8:37:57 AM CDT          levothyroxine  :   levothyroxine ; Status:   Prescribed ; Ordered As Mnemonic:   levothyroxine 100 mcg (0.1 mg) oral tablet ; Simple Display Line:   1 tab(s), Oral, daily, 90 tab(s), 3 Refill(s) ; Ordering Provider:   Nelson Churchill MD; Catalog Code:   levothyroxine ; Order Dt/Tm:   9/16/2020 8:37:56 AM CDT          losartan  :   losartan ; Status:   Prescribed ; Ordered As Mnemonic:   losartan 100 mg oral tablet ; Simple Display Line:   1 tab(s), Oral, daily, 90 tab(s), 3 Refill(s) ; Ordering Provider:   Nelson Churchill MD; Catalog Code:   losartan ; Order Dt/Tm:   9/16/2020 8:37:55 AM CDT            Social History   Social History   (As Of: 9/20/2021 11:34:12 AM CDT)   Alcohol:        Current, social, 3 drinks/episode maximum.   (Last Updated: 3/18/2015 3:13:06 PM CDT by Maria Fernanda Jo CMA)          Tobacco:        Former smoker, quit more than 30 days ago   (Last Updated: 9/20/2021 11:22:46 AM CDT by Danika Flood LPN)          Electronic Cigarette/Vaping:        Electronic Cigarette Use: Never.   (Last Updated: 9/20/2021 11:22:51 AM CDT by Danika Flood LPN)          Substance Abuse:        Never   (Last Updated: 3/18/2015 3:13:12 PM CDT by Maria Fernanda Jo CMA)          Employment/School:        Retired   (Last Updated: 3/18/2015 3:12:23 PM CDT by Maria Fernanda Jo CMA)          Home/Environment:        5 children.   (Last Updated: 3/18/2015 3:13:54 PM CDT by Maria Fernanda Jo CMA)          Exercise:        Exercise frequency: 5-6 times/week.  Exercise type: Walking.   (Last Updated: 3/12/2018 10:06:31 AM  CDT by Selma Mclean MA)          Sexual:        Sexually active: No.   (Last Updated: 3/18/2015 3:12:39 PM CDT by Maria Fernanda Jo CMA)            Hearing and Vision Screening   Audiogram Result Right Ear :   Fail   Audiogram Result Left Ear :   Fail   Hearing Screen Comments :   uses hearing aides   Visual Acuity Evaluated Left Eye :   Fail   Visual Acuity Evaluated Right Eye :   Fail   Vision Screen Comments :   glasses   Danika Flood LPN - 9/20/2021 11:22 AM CDT   Home Safety Screen   Emergency Numbers Kept/Updated :   No   Smoke Alarms/Fire Extinguisher Available :   Yes   Household Members Fire Safety Knowledge :   Yes   Mats in Bathtub/Shower :   Yes   Stairway Rails or Banisters :   Yes   Outdoor Clutter Safety :   Yes   Indoor Clutter Safety :   Yes   Electrical Cord Safety :   Yes   Danika Flood LPN 9/20/2021 11:22 AM CDT   Advance Directives   Advance Directive :   Yes   Danika Flood LPN 9/20/2021 11:22 AM CDT   [place] Fall Risk   History of Fall in Last 3 Months [place] :   No   Danika Flood LPN 9/20/2021 11:22 AM CDT   Functional Assessment   Focused Functional Assessment Grid   Bathing :   Independent (2)   Dressing :   Independent (2)   Toileting :   Independent (2)   Transferring Bed or Chair :   Independent (2)   Continence :   Independent (2)   Feeding :   Independent (2)   Danika Flood LPN 9/20/2021 11:22 AM CDT   ADL Index Score :   12    Capable of Shopping :   Yes   Capable of Walking :   Yes   Capable of Housekeeping :   Yes   Capable of Managing Medications :   Yes   Capable of Handling Finances :   Yes   Danika Flood LPN 9/20/2021 11:22 AM CDT

## 2022-02-15 NOTE — TELEPHONE ENCOUNTER
Entered by Selma Mclean MA on June 30, 2020 2:39:02 PM CDT  ---------------------  From: Selma Mclean MA   To: [facility] #[id number]    Sent: 6/30/2020 2:39:02 PM CDT  Subject: Medication Management     ** Submitted: **  Order:levothyroxine (levothyroxine 100 mcg (0.1 mg) oral tablet)  1 tab(s)  Oral  daily  Qty:  30 tab(s)        Refills:  0          Substitutions Allowed     Route To [facility]ORE #[id number]    Signed by Selma Mclean MA  6/30/2020 7:38:00 PM [facility]    ** Submitted: **  Complete:levothyroxine (levothyroxine 100 mcg (0.1 mg) oral tablet)   Signed by Selma Mclean MA  6/30/2020 7:39:00 PM [facility]    ** Not Approved:  **  levothyroxine (LEVOTHYROXINE 0.100MG (100MCG) TAB)  TAKE 1 TABLET BY MOUTH DAILY  Qty:  90 tab(s)        Days Supply:  90        Refills:  0          Substitutions Allowed     Route To [facility] #[id number]   Signed by Selma Mclean MA            ** Submitted: **  Order:hydroCHLOROthiazide (hydroCHLOROthiazide 25 mg oral tablet)  1 tab(s)  Oral  daily  Qty:  30 tab(s)        Refills:  0          Substitutions Allowed     Route To [facility] #[id number]    Signed by Selma Mclean MA  6/30/2020 7:37:00 PM [facility]    ** Submitted: **  Complete:hydroCHLOROthiazide (hydroCHLOROthiazide 25 mg oral tablet)   Signed by Selma Mclean MA  6/30/2020 7:38:00 PM [facility]    ** Not Approved:  **  hydroCHLOROthiazide (HYDROCHLOROTHIAZIDE 25MG TABLETS)  TAKE 1 TABLET BY MOUTH DAILY  Qty:  90 tab(s)        Days Supply:  90        Refills:  0          Substitutions Allowed     Route To [facility]ORE #[id number]   Signed by Selma Mclean MA            ------------------------------------------  From: [facility] #[id number]  To: Nelson Churchill MD  Sent: June 29, 2020 11:20:28 AM CDT  Subject: Medication Management  Due: June 24, 2020 10:20:04 PM CDT     ** On Hold Pending Signature **     Dispensed Drug: hydroCHLOROthiazide  (hydroCHLOROthiazide 25 mg oral tablet), TAKE 1 TABLET BY MOUTH DAILY  Quantity: 90 tab(s)  Days Supply: 90  Refills: 0  Substitutions Allowed  Notes from Pharmacy:     ** On Hold Pending Signature **     Dispensed Drug: levothyroxine (levothyroxine 100 mcg (0.1 mg) oral tablet), TAKE 1 TABLET BY MOUTH DAILY  Quantity: 90 tab(s)  Days Supply: 90  Refills: 0  Substitutions Allowed  Notes from Pharmacy:  ------------------------------------------Med Refill      Date of last office visit and reason:  6/29/2020 w/ GTG for tick bite      Date of last Med Check / Px:   3/13/19 w/ GTG for px  Date of last labs pertaining to med:  March 2019    Note:  _    RTC order in chart:  RTC now due    For Protocol refill, has patient been contacted:  message sent to pharmacy

## 2022-02-15 NOTE — NURSING NOTE
Comprehensive Intake Entered On:  9/1/2020 9:32 AM CDT    Performed On:  9/1/2020 9:31 AM CDT by Schoenike , Andrea               Summary   Chief Complaint :   Labs and BP   Height Measured :   68 in(Converted to: 5 ft 8 in, 172.72 cm)    Systolic Blood Pressure :   157 mmHg (HI)    Diastolic Blood Pressure :   95 mmHg (HI)    Mean Arterial Pressure :   116 mmHg   Peripheral Pulse Rate :   76 bpm   BP Site :   Left arm   Pulse Site :   Brachial artery   BP Method :   Electronic   HR Method :   Electronic   Schoenike , Andrea - 9/1/2020 9:31 AM CDT   ID Risk Screen   Recent Travel History :   No recent travel   Family Member Travel History :   No recent travel   Other Exposure to Infectious Disease :   Unknown   Schoenike , Andrea - 9/1/2020 9:31 AM CDT   More Vitals   Systolic Blood Pressure Repeat :   149 mmHg   Diastolic Blood Pressure Repeat :   97 mmHg   BP Site Repeat :   Right arm   Schoenike , Andrea - 9/1/2020 9:31 AM CDT

## 2022-02-15 NOTE — PROGRESS NOTES
Patient:   MARIAJOSE FAJARDO            MRN: [id number]            FIN: [id number]               Age:   73 years     Sex:  Female     :  1945   Associated Diagnoses:   Medicare annual wellness visit, subsequent; Essential hypertension; Hypothyroidism; Lipid metabolism disorder   Author:   Nelson Churchill MD      Visit Information      Date of Service: 2019 07:50 am  Performing Location: [facility]  Encounter#: [id number]      Primary Care Provider (PCP):  Nelson Churchill MD    NPI# [id number]   Visit type:  Medicare, annual wellness visit.    Accompanied by:  No one.    Source of history:  Self.    History limitation:  None.       Chief Complaint   3/13/2019 8:09 AM CDT    AWV.  has been checking BP at home.      Well Adult History   Well Adult History             The patient presents for Medicare well exam.  The patient's general health status is described as good.  The patient's diet is described as balanced.  Exercise: routine.  Associated symptoms consist of none.  Last menstrual period: menopausal.  Medical encounters:.  Complaint:.  Additional pertinent history: daily caffeine use, tobacco use none and occasional alcohol use.       mammogram:  due later this month  Pap smear:  n/a  colonoscopy:  n/a  lipid and diabetes screening:  due today  immunizations:  up to date   other:  HTN, hypothyroidism. hyperlipidemia under control      Review of Systems   Constitutional:  No fever, No chills, No sweats, No weakness, No fatigue.    Eye:  No recent visual problem.    Ear/Nose/Mouth/Throat:  No decreased hearing, No nasal congestion, No sore throat.    Respiratory:  No shortness of breath, No cough.    Cardiovascular:  Negative, No chest pain, No palpitations, No peripheral edema.    Gastrointestinal:  No nausea, No vomiting, No diarrhea, No constipation, No heartburn.    Genitourinary:  No dysuria, No change in urine stream.    Hematology/Lymphatics:  No bruising tendency, No bleeding  tendency.    Endocrine:  No cold intolerance, No heat intolerance.    Immunologic:  Negative.    Musculoskeletal:  No back pain, No neck pain, No joint pain, No muscle pain.    Integumentary:  Rash, No dryness, No skin lesion.    Neurologic:  Alert and oriented X4,   Cognitive impairment:  none  Year: [place]  [place]  [place].    Psychiatric:  No anxiety, No depression.    GDS and CAGE reviewed and discussed with patient.      Hearing impairment:  has hearing  ADL: independent  Fall risk:  low  Home safety:  ok    Advanced care planning:  completed      Health Status   Allergies:    Allergic Reactions (Selected)  Severity Not Documented  Alendronate (Itchiness)   Medications:  (Selected)   Prescriptions  Prescribed  hydroCHLOROthiazide 25 mg oral tablet: 1 tab(s), po, daily, # 90 tab(s), 3 Refill(s), Type: Soft Stop, Pharmacy: [facility], 1 tab(s) po daily,x90 day(s)  losartan 100 mg oral tablet: 1 tab(s) ( 100 mg ), PO, Daily, # 90 tab(s), 3 Refill(s), Type: Maintenance, Pharmacy: [facility], 1 tab(s) po daily   Problem list:    All Problems  Osteoporosis / SNOMED CT 817143775 / Confirmed  Macular degeneration, dry / SNOMED CT 8740572398 / Confirmed  Hypothyroidism / SNOMED CT 887476951 / Confirmed  Lipid metabolism disorder / SNOMED CT 051345670 / Confirmed  Essential hypertension / SNOMED CT 53773232 / Confirmed     Other Healthcare:         Histories   Past Medical History:    Active  Essential hypertension (44608848)  Hypothyroidism (132413549)  Lipid metabolism disorder (727723278)   Family History:       Procedure history:    Cataract extraction and insertion of intraocular lens (SNOMED CT 1337265688) on 8/28/2018 at 72 Years.  Comments:  9/13/2018 8:25 AM DOCT - Peyton Smith  Right.  Cataract extraction and insertion of intraocular lens (SNOMED CT 0585977083) on 3/19/2015 at 69 Years.  Comments:  3/26/2015 9:37 AM Peyton Baker  Left.  Colonoscopy (SNOMED CT 213433542) in  2012 at 66 Years.  Repair of rectal prolapse (SNOMED CT 60454714) on 2/6/2012 at 66 Years.  Hysterectomy (SNOMED CT 029105366).  Appendectomy (SNOMED CT 687780701).   Social History:        Alcohol Assessment            Current, social, 3 drinks/episode maximum.      Tobacco Assessment            Past      Substance Abuse Assessment            Never      Employment and Education Assessment            Retired      Home and Environment Assessment            5 children.      Exercise and Physical Activity Assessment            Exercise frequency: 5-6 times/week.  Exercise type: Walking.      Sexual Assessment            Sexually active: No.      Physical Examination   Vital Signs   3/13/2019 8:09 AM CDT Temperature Tympanic 97.8 DegF  LOW    Peripheral Pulse Rate 68 bpm    Pulse Site Radial artery    HR Method Manual    Systolic Blood Pressure 152 mmHg  HI    Diastolic Blood Pressure 88 mmHg  HI    Mean Arterial Pressure 109 mmHg    BP Site Right arm    BP Method Manual      Measurements from flowsheet : Measurements   3/13/2019 8:09 AM CDT Height Measured - Standard 68 in    Weight Measured - Standard 168 lb    BSA 1.91 m2    Body Mass Index 25.54 kg/m2  HI      General:  Alert and oriented, No acute distress.    Eye:  Pupils are equal, round and reactive to light, Extraocular movements are intact, Normal conjunctiva.    HENT:  Normocephalic, Tympanic membranes are clear, Oral mucosa is moist, No pharyngeal erythema, No sinus tenderness.    Neck:  Supple, Non-tender, No carotid bruit, No lymphadenopathy, No thyromegaly.    Respiratory:  Lungs are clear to auscultation, Respirations are non-labored, Breath sounds are equal, No chest wall tenderness.    Cardiovascular:  Normal rate, Regular rhythm, No murmur, No gallop, Normal peripheral perfusion, No edema.    Gastrointestinal:  Soft, Non-tender, No organomegaly.    Musculoskeletal:  Normal range of motion, Normal strength, No swelling, No deformity.    Integumentary:   Warm, Dry, No rash.    Neurologic:  Alert, Oriented, No focal deficits.    Psychiatric:  Cooperative, Appropriate mood & affect.       Review / Management   Results review      Impression and Plan   Diagnosis     Medicare annual wellness visit, subsequent (MME92-XQ Z09).     Course:  Progressing as expected.    Plan:  Counseled on health maintenance, diet, activity, BMI discussed.    Patient Instructions:       Counseled: Regarding diagnosis, Regarding medications, Smoking cessation, Verbalized understanding, Breast cancer, colon cancer, diabetes, lipid, HTN, obesity screening discussed and initiated, Risk factors for development of chronic disease and infirmities of ageing have been discussed and plans for minimizing and screening for these conditions have been discussed.  Plans in place for management of conditions identified.  The preventative screening checklist has been completed and reviewed with the patient and a copy has been filled in the electronic record..    Diagnosis     Essential hypertension (FMW47-YH I10).     Hypothyroidism (YUZ19-IW E03.4).     Lipid metabolism disorder (BUL37-QM E78.2).     Course:  Well controlled.    Plan:  continue current medication.    Orders     Orders (Selected)   Prescriptions  Prescribed  atorvastatin 10 mg oral tablet: = 1 tab(s), po, hs, x 90 day(s), # 90 tab(s), 3 Refill(s), Type: Physician Stop, Pharmacy: [facility], 1 tab(s) Oral hs,x90 day(s)  hydroCHLOROthiazide 25 mg oral tablet: = 1 tab(s), po, daily, # 90 tab(s), 3 Refill(s), Type: Soft Stop, Pharmacy: [id number], 1 tab(s) Oral daily  levothyroxine 100 mcg (0.1 mg) oral tablet: = 1 tab(s), po, daily, x 90 day(s), # 90 tab(s), 3 Refill(s), Type: Physician Stop, Pharmacy: [facility] [address], 1 tab(s) Oral daily,x90 day(s)  losartan 100 mg oral tablet: = 1 tab(s) ( 100 mg ), PO, Daily, # 90 tab(s), 3 Refill(s), Type: Maintenance, Pharmacy: [facility] 72[facility], 1 tab(s)  Oral daily.

## 2022-02-15 NOTE — TELEPHONE ENCOUNTER
---------------------  From: Elham Fabian CMA ([facility] Pool ([id number]))   To: [facility] Coordinators Pool ([facility]);     Sent: 6/1/2020 10:05:50 AM CDT  Subject: FW: Medication Management   Due Date/Time: 5/31/2020 3:43:00 AM CDT           ------------------------------------------  From: [facility] #[id number]  To: Nelson Churchill MD  Sent: May 30, 2020 3:43:05 AM CDT  Subject: Medication Management  Due: May 30, 2020 3:27:43 PM CDT     ** On Hold Pending Signature **     Dispensed Drug: atorvastatin (atorvastatin 10 mg oral tablet), TAKE 1 TABLET BY MOUTH AT BEDTIME  Quantity: 90 tab(s)  Days Supply: 90  Refills: 0  Substitutions Allowed  Notes from Pharmacy:     ** On Hold Pending Signature **     Dispensed Drug: losartan (losartan 100 mg oral tablet), TAKE 1 TABLET BY MOUTH DAILY  Quantity: 90 tab(s)  Days Supply: 90  Refills: 0  Substitutions Allowed  Notes from Pharmacy:  ------------------------------------------Atorvastatin has been completed off of pt's med list (It looks like it was auto completed). Looks like she is in recall for July/Aug for AWV. OK to fill until that time?---------------------  From: Diana Parekh CMA ([facility] Pool ([facility]))   To: BENSON King Pool ([id number]_[place] [facility]);     Sent: 6/1/2020 1:35:52 PM CDT  Subject: FW: Medication Management   Due Date/Time: 5/31/2020 3:43:00 AM CDT  ** Submitted: **  Order:atorvastatin (atorvastatin 10 mg oral tablet)  1 tab(s)  Oral  qhs  Qty:  90 tab(s)        Refills:  0          Substitutions Allowed     Route To Pharmacy - [facility] #[id number]    Signed by Selma Mclean MA  6/1/2020 7:11:00 PM    ** Not Approved:  **  atorvastatin (ATORVASTATIN 10MG TABLETS)  TAKE 1 TABLET BY MOUTH AT BEDTIME  Qty:  90 tab(s)        Refills:  0          Substitutions Allowed     Details:  90 tab(s), TAKE 1 TABLET BY MOUTH AT BEDTIME, [facility][id number], 5/30/2020,  3/4/2020, Kerline Segura MD, [place]      Route To Pharmacy [facility] #[id number]   Signed by Selma Mclean MA---------------------  From: Selma Mclean MA   To: [facility] #[id number]    Sent: 6/1/2020 2:13:09 PM CDT  Subject: FW: Medication Management     ** Submitted: **  Order:losartan (losartan 100 mg oral tablet)  1 tab(s)  Oral  daily  Qty:  90 tab(s)        Days Supply:  90        Refills:  0          Substitutions Allowed     Route To Pharmacy [facility] #[id number]    Signed by Selma Mclean MA  6/1/2020 7:12:00 PM    ** Submitted: **  Complete:losartan (losartan 100 mg oral tablet)   Signed by Selma Mclean MA  6/1/2020 7:13:00 PM    ** Not Approved:  **  losartan (LOSARTAN 100MG TABLETS)  TAKE 1 TABLET BY MOUTH DAILY  Qty:  90 tab(s)        Refills:  0          Substitutions Allowed     Details:  90 tab(s), TAKE 1 TABLET BY MOUTH DAILY, Route to Pharmacy Electronically [facility] #[id number], 5/30/2020, 3/4/2020, 90, Nelson Churchill MD      Route To Pharmacy [facility] #[id number]   Signed by Selma Mclean MA

## 2022-02-15 NOTE — NURSING NOTE
Quick Intake Entered On:  3/13/2019 9:00 AM CDT    Performed On:  3/13/2019 8:59 AM CDT by Selma Mclean MA               More Vitals   Systolic Blood Pressure Repeat :   142 mmHg   Diastolic Blood Pressure Repeat :   80 mmHg   BP Site Repeat :   Right arm   Selma Mclean MA - 3/13/2019 8:59 AM CDT

## 2022-02-15 NOTE — PROGRESS NOTES
Chief Complaint    Discuss results of bone density.  History of Present Illness      Patient is here for follow-up on her recent bone density test.  She has developed osteoporosis in the hip.  T-value is -2.5.  She has been on alendronate in the past.  She reports no troubles with medication.  Review of Systems      See HPI.  All other review of systems negative.  Physical Exam   Vitals & Measurements    T: 97.6(Tympanic)  HR: 72(Peripheral)  BP: 128/78     WT: 168.4 lb       Alert, oriented, no acute distress      Normal heart rate      Nonlabored breathing  Assessment/Plan       Osteoporosis         Start alendronate 35 mg weekly          repeat bone density in 1 year         Discussed side effects of medication       Orders:         alendronate, 1 tab(s) ( 35 mg ), PO, qWeek, # 12 tab(s), 3 Refill(s), Type: Maintenance, Pharmacy: [id number], 1 tab(s) po qweek,x84 day(s)  Patient Information     Name:MARIAJOSE FAJARDO      Address:      [address] [place] [id number]     Sex:Female     YOB: 1945     [phone]     Emergency Contact:MONIQUE NIETO     MRN:[id number]     FIN:[id number]     Location:[facility]     Date of Service:03/27/2018      Primary Care Physician:       Nelson Churchill MD, [phone]  Problem List/Past Medical History    Ongoing     Essential hypertension     Hypothyroidism     Lipid metabolism disorder     Macular degeneration, dry     Osteoporosis    Historical  Procedure/Surgical History     Cataract extraction and insertion of intraocular lens (03/19/2015)     Repair of rectal prolapse (02/06/2012)     Colonoscopy (2012)     Appendectomy     Hysterectomy  Medications        hydroCHLOROthiazide 25 mg oral tablet: 1 tab(s), po, daily, 90 tab(s), 0 Refill(s).        levothyroxine 100 mcg (0.1 mg) oral tablet: 1 tab(s), po, daily, for 90 day(s), 90 tab(s), 3 Refill(s).        atorvastatin 10 mg oral tablet: 1 tab(s),  po, hs, for 90 day(s), 90 tab(s), 3 Refill(s).        losartan 100 mg oral tablet: 100 mg, 1 tab(s), PO, Daily, 90 tab(s), 3 Refill(s).        alendronate 35 mg oral tablet: 35 mg, 1 tab(s), PO, qWeek, for 84 day(s), 12 tab(s), 3 Refill(s).                Allergies    No known allergies  Social History    Smoking Status - 03/12/2018     Former smoker     Alcohol - 03/12/2018      Current, social, 3 drinks/episode maximum.     Employment and Education - 03/12/2018      Retired     Exercise and Physical Activity - 03/12/2018      Exercise frequency: 5-6 times/week. Exercise type: Walking.     Home and Environment - 03/12/2018      5 children.     Sexual - 03/12/2018      Sexually active: No.     Substance Abuse - 03/12/2018      Never     Tobacco - 03/12/2018      Past  Immunizations      Vaccine Date Status Comments      influenza virus vaccine, inactivated 09/18/2017 Recorded      pneumococcal (PPSV23) 03/02/2017 Given      influenza virus vaccine, inactivated 10/03/2016 Recorded      influenza 10/03/2016 Recorded [facility]      pneumococcal (PCV13) 03/14/2016 Given      ZOS, shingles 03/14/2016 Recorded      influenza virus vaccine, inactivated 10/12/2015 Recorded [10/13/2015] Pauly Thapa  Lab Results      Results (Last 90 days)                Laboratory                     Chemistry                          General Chemistry                               BUN:      18 mg/dL  (03/12/18 10:59 AM CDT)                                                                                                                                          BUN/Creat Ratio:      NOT APPLICABLE   (03/12/18 10:59 AM CDT)                                                                                                                                          Basic Metabolic Profile:         (03/12/18 10:59 AM CDT)                                                                                                                                           CO2 Level:      28 mmol/L  (03/12/18 10:59 AM CDT)                                                                                                                                          Calcium Level:      9.7 mg/dL  (03/12/18 10:59 AM CDT)                                                                                                                                          Chloride Level:      106 mmol/L  (03/12/18 10:59 AM CDT)                                                                                                                                          Creatinine Level:      0.81 mg/dL  (03/12/18 10:59 AM CDT)                                                                                                                                          Glucose Level:      H 106 mg/dL (Range 65 - 99)  (03/12/18 10:59 AM CDT)                                                                                                                                          Potassium Level:      3.8 mmol/L  (03/12/18 10:59 AM CDT)                                                                                                                                          Sodium Level:      144 mmol/L  (03/12/18 10:59 AM CDT)                                                                                                                                          eGFR:      72 mL/min/1.73m2  (03/12/18 10:59 AM CDT)                                                                                                                                          eGFR African American:      84 mL/min/1.73m2  (03/12/18 10:59 AM CDT)                                                                                                                                     Lipids                               Cholesterol:      187 mg/dL  (03/12/18 10:59 AM CDT)                                                                                                                                           Cholesterol/HDL Ratio:      3.3   (03/12/18 10:59 AM CDT)                                                                                                                                          HDL:      57 mg/dL  (03/12/18 10:59 AM CDT)                                                                                                                                          LDL:      H 107   (03/12/18 10:59 AM CDT)                                                                                                                                          Lipid Profile:         (03/12/18 10:59 AM CDT)                                                                                                                                          Non-HDL Cholesterol:      H 130  (Range  - <130)  (03/12/18 10:59 AM CDT)                                                                                                                                          Triglyceride:      124 mg/dL  (03/12/18 10:59 AM CDT)                                                                                                                                     Thyroid Studies                               TSH                                        (03/12/18 10:59 AM CDT)                                                                                                                                                3.75 mIU/L  (03/12/18 10:59 AM CDT)

## 2022-02-15 NOTE — NURSING NOTE
Medicare Visit Entered On:  2020 8:05 AM CDT    Performed On:  2020 7:56 AM CDT by Vinay WHIPPLE, [place]               Summary   Chief Complaint :   AWV, doesn't need refills until November.   Weight Measured :   169.6 lb(Converted to: 169 lb 10 oz, 76.93 kg)    Height Measured :   68 in(Converted to: 5 ft 8 in, 172.72 cm)    Body Mass Index :   25.78 kg/m2 (HI)    Body Surface Area :   1.92 m2   Systolic Blood Pressure :   140 mmHg (HI)    Diastolic Blood Pressure :   80 mmHg   Mean Arterial Pressure :   100 mmHg   Peripheral Pulse Rate :   78 bpm   BP Site :   Right arm   Pulse Site :   Radial artery   BP Method :   Manual   HR Method :   Manual   Temperature Tympanic :   98.3 DegF(Converted to: 36.8 DegC)    Oxygen Saturation :   95 %   Selma Mclean MA - 2020 7:56 AM CDT   Health Status   Allergies Verified? :   Yes   Medication History Verified? :   Yes   Immunizations Current :   Yes   Medical History Verified? :   Yes   Pre-Visit Planning Status :   Completed   Tobacco Use? :   Former smoker   Selma Mclean MA - 2020 7:56 AM CDT   Demographics   Last Name :   SCOTTY   Address :   [address] [place] [address]   First Name :   MARIAJOSE Nicholas Initial :   A   Responsible Party Date of Birth () :   1945 CST   City :   [place]   State :   [place]   Zip Code :   [address]   Contact Relationship to Patient (other) :   Patient   Selma Mclean MA - 2020 7:56 AM CDT   Providers Grid   Provider Name :    Dr. Hernandez              Provider Specialty :    Optometrist                Selma Mclean MA 2020 7:56 AM CDT         Ancillary Services Grid   Name :    Pauly              Type of Service :    Pharmacy                [place]ma Mclean MA - 2020 7:56 AM CDT         Consents   Consent for Immunization Exchange :   Consent Granted   Consent for Immunizations to Providers :   Consent Granted   Selma Mclean MA 2020 7:56 AM CDT   Meds / Allergies   (As Of: 2020 8:05:30 AM  CDT)   Allergies (Active)   alendronate  Estimated Onset Date:   Unspecified ; Reactions:   itchiness ; Created By:   Selma Mclean MA; Reaction Status:   Active ; Category:   Drug ; Substance:   alendronate ; Type:   Allergy ; Updated By:   Selma Mclean MA; Source:   Patient ; Reviewed Date:   8/26/2020 8:54 AM CDT        Medication List   (As Of: 9/16/2020 8:05:30 AM CDT)   Prescription/Discharge Order    atorvastatin  :   atorvastatin ; Status:   Prescribed ; Ordered As Mnemonic:   atorvastatin 10 mg oral tablet ; Simple Display Line:   1 tab(s), Oral, qhs, 30 tab(s), 0 Refill(s) ; Ordering Provider:   Nelson Churchill MD; Catalog Code:   atorvastatin ; Order Dt/Tm:   8/31/2020 11:28:36 AM CDT          hydroCHLOROthiazide  :   hydroCHLOROthiazide ; Status:   Prescribed ; Ordered As Mnemonic:   hydroCHLOROthiazide 25 mg oral tablet ; Simple Display Line:   1 tab(s), Oral, daily, 30 tab(s), 0 Refill(s) ; Ordering Provider:   Nelson Churchill MD; Catalog Code:   hydroCHLOROthiazide ; Order Dt/Tm:   8/26/2020 9:08:05 AM CDT          levothyroxine  :   levothyroxine ; Status:   Prescribed ; Ordered As Mnemonic:   levothyroxine 100 mcg (0.1 mg) oral tablet ; Simple Display Line:   1 tab(s), Oral, daily, 30 tab(s), 0 Refill(s) ; Ordering Provider:   Nelson Churchill MD; Catalog Code:   levothyroxine ; Order Dt/Tm:   8/26/2020 9:08:28 AM CDT          losartan  :   losartan ; Status:   Prescribed ; Ordered As Mnemonic:   losartan 100 mg oral tablet ; Simple Display Line:   1 tab(s), Oral, daily, 30 tab(s), 0 Refill(s) ; Ordering Provider:   Nelson Churchill MD; Catalog Code:   losartan ; Order Dt/Tm:   8/31/2020 11:29:30 AM CDT            Social History   Social History   (As Of: 9/16/2020 8:05:30 AM CDT)   Alcohol:        Current, social, 3 drinks/episode maximum.   (Last Updated: 3/18/2015 3:13:06 PM CDT by Maria Fernanda Jo CMA)          Tobacco:        Past   (Last Updated: 3/18/2015 3:12:45 PM CDT by  Maria Fernanda Jo CMA)          Substance Abuse:        Never   (Last Updated: 3/18/2015 3:13:12 PM CDT by Maria Fernanda Jo CMA)          Employment/School:        Retired   (Last Updated: 3/18/2015 3:12:23 PM CDT by Maria Fernanda Jo CMA)          Home/Environment:        5 children.   (Last Updated: 3/18/2015 3:13:54 PM CDT by Maria Fernanda Jo CMA)          Exercise:        Exercise frequency: 5-6 times/week.  Exercise type: Walking.   (Last Updated: 3/12/2018 10:06:31 AM CDT by [place]ma Mclean MA)          Sexual:        Sexually active: No.   (Last Updated: 3/18/2015 3:12:39 PM CDT by Maria Fernanda Jo CMA)            Geriatric Depression Screening   Geriatric Depression Satisfied Life :   Yes   Geriatric Depression Dropped Activities :   No   Geriatric Depression Life Empty :   No   Geriatric Depression Bored :   No   Geriatric Depression Good Spirits :   Yes   Geriatric Depression Afraid Bad Things :   No   Geriatric Depression Feel Happy :   Yes   Geriatric Depression Feel Helpless :   No   Geriatric Depression Prefer to Stay Home :   No   Geriatric Depression Memory Problems :   No   Geriatric Depression Wonderful Be Alive :   Yes   Geriatric Depression Feel Worthless :   No   Geriatric Depression Situation Hopeless :   No   Geriatric Depression Others Better Off :   No   Geriatric Depression Full of Energy :   Yes   Geriatric Depression Total Score :   0    Selma Mclean MA - 9/16/2020 7:56 AM CDT   Hearing and Vision Screening   Hearing Screen Comments :   Pt wears bilateral hearing aids   Selma Mclean MA - 9/16/2020 7:56 AM CDT   Home Safety Screen   Emergency Numbers Kept/Updated :   Yes   Aware of Smoking Dangers :   Yes   Smoke Alarms/Fire Extinguisher Available :   Yes   Household Members Fire Safety Knowledge :   Yes   Floor Rugs Removed or Fastened :   Yes   Mats in Bathtub/Shower :   Yes   Outdoor Clutter Safety :   Yes   Indoor Clutter Safety :   Yes   Electrical Cord Safety :   Yes   Vinay WHIPPLE  [place] - 9/16/2020 7:56 AM CDT   Advance Directives   Advance Directive :   Yes   Vinay WHIPPLE, [place] - 9/16/2020 7:56 AM CDT   Gannon Fall Risk   History of Fall in Last 3 Months Gannon :   No   Vinay WHIPPLE, [place] - 9/16/2020 7:56 AM CDT   Functional Assessment   Focused Functional Assessment Grid   Bathing :   Independent (2)   Dressing :   Independent (2)   Toileting :   Independent (2)   Transferring Bed or Chair :   Independent (2)   Feeding :   Independent (2)   Vinay WHIPPLE, [place] - 9/16/2020 7:56 AM CDT   Capable of Shopping :   Yes   Capable of Walking :   Yes   Capable of Housekeeping :   Yes   Capable of Managing Medications :   Yes   Capable of Handling Finances :   Yes   Vinay WHIPPLE, [place] - 9/16/2020 7:56 AM CDT

## 2022-02-15 NOTE — PROGRESS NOTES
Patient:   MARIAJOSE FAJARDO            MRN: [id number]            FIN: [id number]               Age:   74 years     Sex:  Female     :  1945   Associated Diagnoses:   Diarrhea; Lightheaded   Author:   Josemanuel Read MD      Visit Information      Date of Service: 2020 08:26 am  Performing Location: [facility]  Encounter#: [id number]      Primary Care Provider (PCP):  Nelson Churchill MD    NPI# [id number]      Referring Provider:  Josemanuel Read MD    NPI# [id number]   Visit type:  Telephone Encounter.    Source of history:  Patient.    Location of patient:  home  Call Start Time:   908 am  Call End Time:    _0914 am      Chief Complaint   2020 8:51 AM CDT    phone visit: upset stomach, dizziness, dieaherra, cold and clammy, verbal consent for visit patients brother is with her.   _      History of Present Illness   Today's visit was conducted via telephone due to the COVID-19 pandemic. Patient's consent to telephone visit was obtained and documented.      Reason for visit:    patient with diarrhea yesterday afternoon starting around 3pm  has had intermittent nausea, no fevers  no vomiting  has been drinking propel, appetite is poor and not eating  no abdominal pain  no cough, no congestion, some nasal congestion and sneezing  felt dizzy, no blood in stool, stool did look dark  no urinary symptoms  currently feeling pretty good, slept well last night, had episode of nausea this morning, still feeling mildly lightheaded         Impression and Plan   Diagnosis     Diarrhea (DLB43-GE R19.7).     Lightheaded (WUE66-PF R42).     Course:  Improving.    Plan:  discussed options with patient of face to face visit for further evaluation vs monitoring at home, given that she is feeling better her preference is to monitor her symptoms at home and follow up as needed if symptoms worsen. Will push fluids and add food as she is able. Likely symptoms are related to GI illness, but if symptoms  worsen needs to be evaluated for GI bleeding, diverticular disease, or other causes..       Health Status   Allergies:    Allergic Reactions (Selected)  Severity Not Documented  Alendronate (Itchiness)   Medications:  (Selected)   Prescriptions  Prescribed  atorvastatin 10 mg oral tablet: = 1 tab(s), Oral, qhs, # 90 tab(s), 0 Refill(s), Type: Maintenance, Pharmacy: [id number], Pt due for annual check and fasting labs next month for further refills., 1 tab(s) Oral qhs, 68, in, 03/13/19 8:09:00 CDT, Height Measured, 168,...  hydroCHLOROthiazide 25 mg oral tablet: = 1 tab(s), Oral, daily, # 30 tab(s), 0 Refill(s), Type: Maintenance, Pharmacy: [facility] #[id number], 68, in, 08/26/20 8:51:00 CDT, Height Measured, 169.8, lb, 06/29/20 10:13:00 CDT, Weight Measured  levothyroxine 100 mcg (0.1 mg) oral tablet: = 1 tab(s), Oral, daily, # 30 tab(s), 0 Refill(s), Type: Maintenance, Pharmacy: [facility] #[id number], 68, in, 08/26/20 8:51:00 CDT, Height Measured, 169.8, lb, 06/29/20 10:13:00 CDT, Weight Measured  losartan 100 mg oral tablet: = 1 tab(s), Oral, daily, # 90 tab(s), 0 Refill(s), Type: Maintenance, Pharmacy: [facility] #[id number], Pt due for annual check up and labs next month for further refills., 68, in, 03/13/19 8:09:00 CDT, Height Measured, 168, lb, 03/13/19 8:09:00...   Problem list:    All Problems  Essential hypertension / SNOMED CT 59433180 / Confirmed  Hypothyroidism / SNOMED CT 430664440 / Confirmed  Lipid metabolism disorder / SNOMED CT 916261205 / Confirmed  Macular degeneration, dry / SNOMED CT 6143065030 / Confirmed  Osteoporosis / SNOMED CT 281997656 / Confirmed      Histories   Past Medical History:    Active  Essential hypertension (SNOMED CT 19868187)  Hypothyroidism (SNOMED CT 494737473)  Lipid metabolism disorder (SNOMED CT 183764869)   Family History:       Procedure history:    Cataract extraction and insertion of intraocular lens ([id number]) on 8/28/2018 at 72  Years.  Comments:  9/13/2018 8:25 AM CDT - Peyton Smith  Right.  Cataract extraction and insertion of intraocular lens ([id number]) on 3/19/2015 at 69 Years.  Comments:  3/26/2015 9:37 AM CDT - Peyton Smith  Left.  Colonoscopy ([id number]) in 2012 at 66 Years.  Repair of rectal prolapse (72906239) on 2/6/2012 at 66 Years.  Hysterectomy (399276965).  Appendectomy (708420430).   Social History:        Alcohol Assessment            Current, social, 3 drinks/episode maximum.      Tobacco Assessment            Past      Substance Abuse Assessment            Never      Employment and Education Assessment            Retired      Home and Environment Assessment            5 children.      Exercise and Physical Activity Assessment            Exercise frequency: 5-6 times/week.  Exercise type: Walking.      Sexual Assessment            Sexually active: No.        Physical Examination   Measurements from flowsheet : Measurements   8/26/2020 8:51 AM CDT    Height Measured - Standard                68 in

## 2022-02-15 NOTE — NURSING NOTE
Removed ABPM @ 1062. Received completed diary. Downloaded, printed, and gave report to Dr. Churchill.

## 2022-02-15 NOTE — LETTER
(Inserted Image. Unable to display)   March 14, 2019        MARIAJOSE FAJARDO      [address]    [place], [place] [id number]        Dear MARIAJOSE,    Thank you for selecting [facility] for your health care needs.  Below you will find the results of your recent test(s) done at our clinic.     Your tests were all normal.  Continue with your current medications.      Result Name Current Result Previous Result Reference Range   Sodium Level (mmol/L)  140 3/13/2019  144 3/12/2018 135 - 146   Potassium Level (mmol/L)  3.9 3/13/2019  3.8 3/12/2018 3.5 - 5.3   Chloride Level (mmol/L)  103 3/13/2019  106 3/12/2018 98 - 110   CO2 Level (mmol/L)  27 3/13/2019  28 3/12/2018 20 - 32   Glucose Level (mg/dL) ((H)) 108 3/13/2019 ((H)) 106 3/12/2018 65 - 99   BUN (mg/dL)  20 3/13/2019  18 3/12/2018 7 - 25   Creatinine Level (mg/dL)  0.87 3/13/2019  0.81 3/12/2018 0.60 - 0.93   eGFR (mL/min/1.73m2)  66 3/13/2019  72 3/12/2018 > OR = 60 -    Calcium Level (mg/dL)  9.5 3/13/2019  9.7 3/12/2018 8.6 - 10.4   TSH (mIU/L)  4.45 3/13/2019  3.75 3/12/2018 0.40 - 4.50       Please contact me or my assistant at [phone] if you have any questions about your results.    Sincerely,        George Churchill MD        What do your labs mean?  Below is a glossary of commonly ordered labs:  LDL   Bad Cholesterol   HDL   Good Cholesterol  AST/ALT   Liver Function   Cr/Creatinine   Kidney Function  Microalbumin   Kidney Function  BUN   Kidney Function  PSA   Prostate    TSH   Thyroid Hormone  HgbA1c   Diabetes Test   Hgb (Hemoglobin)   Red Blood Cells

## 2022-02-15 NOTE — CARE COORDINATION
ACTION PLAN   Goal(s): Hypertension management   Other concerns hypothyroid and macular degeneration to try to keep stable         Today s Date:              05/31/2017                          Goal(s) completion date:      Steps to be taken  High blood pressure  When will I accomplish these steps Barriers Status    Talking medications as prescribed.   Anxious when she comes into clinic.  Needs to sit for a bit before we retake her blood pressure.   05/31/2017  Essential hypertension  Increase losartan to 50 mg daily  Follow-up in 1 month  We will have blood pressure checked early next week  Recent Blood pressure. Improving and discussed goal.    176/95 P66  165/92 P69  Feeling well and doing well on new meds.  Will stop in again next Monday for BP check.   06/09/2017  Blood pressures are still running high.   Dr. KNOWLES wanted her to take 100mg of losartan a day.  She has enough at home to double her dose and then we will check again next Friday  .  10/24/2017  Daysi is doing well.    Impression and Plan   Diagnosis     Hypertension (THX06-MT I10).     Course:  Progressing as expected, Well controlled.    Plan:  Enroll in exercise program, Monitor blood pressure, Weight monitoring, continue current medication.         Diet: Low cholesterol, Sodium restricted.    Patient Instructions:       Counseled: Regarding treatment ( Hypertension ( Diet management, Medication management, Physical activity, Weight management ) ).    Her son has moved back to this area and she is happy about this.  Will be closer to grandchildren.       Steps to be taken  Health Maint.  When will I accomplish these steps Barriers Status    Annual Exam   Due for annual exam in March.  Will update PHQ9 and CAGE at that visit.  Will check on flu shot when she stops for next BP check.

## 2022-02-15 NOTE — PROGRESS NOTES
Patient:   MARIAJOSE FAJARDO            MRN: [id number]            FIN: [id number]               Age:   71 years     Sex:  Female     :  1945   Associated Diagnoses:   Medicare annual wellness visit, subsequent; Essential hypertension; Hypothyroidism; Lipid metabolism disorder; BPPV (benign paroxysmal positional vertigo)   Author:   Nelson Churchill MD      Visit Information      Date of Service: 2017 12:45 pm  Performing Location: [facility]  Encounter#: [id number]      Primary Care Provider (PCP):  Nelson Churchill MD    NPI# [id number]   Visit type:  Medicare, annual wellness visit.    Accompanied by:  No one.    Source of history:  Self.    History limitation:  None.       Chief Complaint   3/2/2017 12:56 PM CST    AWV.   unsure if thyroid is off--doesn't feel right.        Well Adult History   Well Adult History             The patient presents for Medicare well exam.  The patient's general health status is described as good.  The patient's diet is described as balanced.  Exercise: routine.  Associated symptoms consist of none.  Last menstrual period: patient no longer menstruates due to hysterectomy.  Medical encounters: none.  Complaint:.  Additional pertinent history: daily caffeine use, tobacco use none and occasional alcohol use.       mammogram:: due  Pap smear:  n/a  colonoscopy:  up to date   lipid and diabetes screening:  up to date   immunizations:  up to date   other: concerns about mild vertigo symptoms with change in position, wonders if thyroid function is off, HTN, hyperlipidemia aree under control      Well Child History   Well Child History      Review of Systems   Constitutional:  Negative, No fever, No chills, No sweats, No weakness, No fatigue.    Eye:  Negative, No recent visual problem.    Ear/Nose/Mouth/Throat:  Negative, No decreased hearing, No nasal congestion, No sore throat.    Respiratory:  Negative, No shortness of breath, No cough.    Cardiovascular:   Negative, No chest pain, No palpitations, No peripheral edema.    Gastrointestinal:  Negative, No nausea, No vomiting, No diarrhea, No constipation, No heartburn.    Genitourinary:  Negative, No dysuria, No change in urine stream.    Hematology/Lymphatics:  Negative, No bruising tendency, No bleeding tendency.    Endocrine:  Negative, No cold intolerance, No heat intolerance.    Immunologic:  Negative.    Musculoskeletal:  Negative, No back pain, No neck pain, No joint pain, No muscle pain.    Integumentary:  Rash, No dryness, No skin lesion.    Neurologic:  Alert and oriented X4,   Cognitive impairment:   none  Year:  ok  Date: ok  City: [place].    Psychiatric:  Negative, No anxiety, No depression.    PHQ9 and CAGE reviewed and discussed with patient.      Hearing impairment:  yes  ADL:  independent  Fall risk:  mild  Home safety:  no concerns    Advanced care planning:  completed      Health Status   Allergies:    Allergic Reactions (Selected)  No known allergies   Medications:  (Selected)   Prescriptions  Prescribed  atorvastatin 10 mg oral tablet: 1 tab(s), po, hs, x 90 day(s), # 90 tab(s), 2 Refill(s), Type: Physician Stop, Pharmacy: [id number], 1 tab(s) po hs  levothyroxine 100 mcg (0.1 mg) oral tablet: 1 tab(s), po, daily, x 90 day(s), # 90 tab(s), 2 Refill(s), Type: Physician Stop, Pharmacy: [id number], 1 tab(s) po daily  losartan 25 mg oral tablet: 1 tab(s), po, daily, x 90 day(s), # 90 tab(s), 2 Refill(s), Type: Physician Stop, Pharmacy: [id number], 1 tab(s) po daily   Problem list:    All Problems  Hypothyroidism / SNOMED CT 631564170 / Confirmed  Essential hypertension / SNOMED CT 32746591 / Confirmed  Macular degeneration, dry / SNOMED CT 6531011268 / Confirmed  Lipid metabolism disorder / SNOMED CT 083711145 / Confirmed     Other Healthcare:         Histories   Past Medical History:    Active  Essential hypertension (94280336)  Hypothyroidism  (800135966)  Lipid metabolism disorder (410120897)   Family History:       Procedure history:    Cataract extraction and insertion of intraocular lens (SNOMED CT 3070091102) on 3/19/2015 at 69 Years.  Comments:  3/26/2015 9:37 AM - LuisPeyton boyle  Left.  Repair of rectal prolapse (SNOMED CT 58611541) on 2/6/2012 at 66 Years.  Hysterectomy (SNOMED CT 254503273).  Appendectomy (SNOMED CT 014271956).   Social History:        Alcohol Assessment            Current, social, 3 drinks/episode maximum.      Tobacco Assessment            Past      Substance Abuse Assessment            Never      Employment and Education Assessment            Retired      Home and Environment Assessment            5 children.      Exercise and Physical Activity Assessment            Exercise frequency: Daily.  Exercise type: Walking.      Sexual Assessment            Sexually active: No.        Physical Examination   Vital Signs   3/2/2017 1:41 PM CST BP Systolic Repeat 152 mmHg    BP Diastolic Repeat 96 mmHg   3/2/2017 12:56 PM CST Temperature Tympanic 97.1 DegF  LOW    Peripheral Pulse Rate 72 bpm    Pulse Site Radial artery    HR Method Manual    Systolic Blood Pressure 156 mmHg  HI    Diastolic Blood Pressure 96 mmHg  HI    Mean Arterial Pressure 116 mmHg    BP Site Right arm    BP Method Manual      Measurements from flowsheet : Measurements   3/2/2017 12:56 PM CST Height Measured - Standard 68 in    Weight Measured - Standard 170.6 lb    BSA 1.92 m2    Body Mass Index 25.94 kg/m2      General:  No acute distress.    Developmental screen - 15 month:  Elicited on exam.    Eye:  Pupils are equal, round and reactive to light, Extraocular movements are intact, Normal conjunctiva.    HENT:  Normocephalic, Tympanic membranes are clear, Normal hearing, Oral mucosa is moist, No pharyngeal erythema.    Neck:  Supple, Non-tender, No lymphadenopathy.    Respiratory:  Lungs are clear to auscultation, Respirations are non-labored, Breath sounds are  equal.    Cardiovascular:  Normal rate, Regular rhythm, No murmur, No gallop, Normal peripheral perfusion.    Breast:  No mass, No tenderness, No discharge.    Gastrointestinal:  Soft, Non-tender, Non-distended, No organomegaly.    Genitourinary:  No costovertebral angle tenderness.    Musculoskeletal:  Normal range of motion, No swelling, No deformity.    Integumentary:  Warm, Dry, Pink.    Neurologic:  Alert, Oriented, Normal sensory, Normal motor function, No focal deficits, Normal coordination.    Psychiatric:  Cooperative, Appropriate mood & affect.       Review / Management   Results review      Impression and Plan   Diagnosis     Medicare annual wellness visit, subsequent (YKB52-SN Z09).     Essential hypertension (YQM55-AQ I10).     BPPV (benign paroxysmal positional vertigo) (BJP13-JO H81.10).     Hypothyroidism (HFE75-AF E03.4).     Lipid metabolism disorder (IBJ77-MV E78.2).     Course:  Progressing as expected.    Plan:  Counseled on health maintenance, diet, activity, BMI discussed, She will continue with the current medications.  Lipid panel, metabolic panel, and TSH ordered today.  Discussed benign positional vertigo and safety measures.  Referral to audiology for complete hearing screen been initiated.  Vertigo does not improve may need physical therapy. .    Patient Instructions:       Counseled: Regarding diagnosis, Regarding medications, Smoking cessation, Verbalized understanding, Breast cancer, colon cancer, diabetes, lipid, HTN, obesity screening discussed and initiated, Risk factors for development of chronic disease and infirmities of ageing have been discussed and plans for minimizing and screening for these conditions have been discussed.  Plans in place for management of conditions identified.  The preventative screening checklist has been completed and reviewed with the patient and a copy has been filled in the electronic record..

## 2022-02-15 NOTE — NURSING NOTE
CAGE Assessment Entered On:  9/16/2020 7:56 AM CDT    Performed On:  9/16/2020 7:56 AM CDT by Selma Mclean MA               Assessment   Have you ever felt you should cut down on your drinking :   No   Have people annoyed you by criticizing your drinking :   No   Have you ever felt bad or guilty about your drinking :   No   Have you ever taken a drink first thing in the morning to steady your nerves or get rid of a hangover (Eye-opener) :   No   CAGE Score :   0    Selma Mclean MA - 9/16/2020 7:56 AM CDT

## 2022-02-15 NOTE — CARE COORDINATION
CC met with the patient today at an appointment with Dr. KNOWLES regards to CCM program.  Daysi agrees to join the program and a consent was signed.  Educational material and questionnaire were given.    CC will be mostly working with HTN with patient.    CC numbers were given and all questions and concerns were answered.  Patient verbalized understanding and agreed.

## 2022-02-15 NOTE — PROGRESS NOTES
Patient:   MARIAJOSE FAJARDO            MRN: [id number]            FIN: [id number]               Age:   74 years     Sex:  Female     :  1945   Associated Diagnoses:   Medicare annual wellness visit, subsequent; Encounter for immunization; Essential hypertension; Hypothyroidism; Lipid metabolism disorder   Author:   Nelson Churchill MD      Visit Information      Date of Service: 2020 07:49 am  Performing Location: [facility]  Encounter#: [id number]      Primary Care Provider (PCP):  Nelson Churchill MD    NPI# [id number]   Visit type:  Medicare, annual wellness visit.    Accompanied by:  No one.    Source of history:  Self.    History limitation:  None.       Chief Complaint   2020 7:56 AM CDT    AWV, doesn't need refills until November.        Well Adult History   Well Adult History             The patient presents for Medicare well exam.  The patient's general health status is described as good.  The patient's diet is described as balanced.  Exercise: routine, walking.  Associated symptoms consist of.  Last menstrual period:.  Medical encounters:.  Complaint:.  Additional pertinent history: daily caffeine use, tobacco use none and no alcohol use.       mammogram:  due  Pap smear:  n/a  colonoscopy:  n/a  lipid and diabetes screening:  up to date   immunizations:  due for influenza, second Shingrix  other:  HTN, hyperlipidemia, hypothyroidism under control, doing well with medications      Review of Systems   Constitutional:  No fever, No chills, No sweats, No weakness, No fatigue.    Eye:  No recent visual problem.    Ear/Nose/Mouth/Throat:  No decreased hearing, No nasal congestion, No sore throat.    Respiratory:  No shortness of breath, No cough.    Cardiovascular:  Negative, No chest pain, No palpitations, No peripheral edema.    Gastrointestinal:  No nausea, No vomiting, No diarrhea, No constipation, No heartburn.    Genitourinary:  No dysuria, No change in urine stream.     Hematology/Lymphatics:  No bruising tendency, No bleeding tendency.    Endocrine:  No cold intolerance, No heat intolerance.    Immunologic:  Negative.    Musculoskeletal:  No back pain, No neck pain, No joint pain, No muscle pain.    Integumentary:  Rash, No dryness, No skin lesion.    Neurologic:  Alert and oriented X4,   Cognitive impairment:  none   Year: [place]  Date[place]  City[place].    Psychiatric:  No anxiety, No depression.    GDS and CAGE reviewed and discussed with patient.      Hearing impairment:  wears hearing aids  ADL: independent  Fall risk:  low  Home safety:  OK    Advanced care planning:  completed      Health Status   Allergies:    Allergic Reactions (Selected)  Severity Not Documented  Alendronate (Itchiness)   Medications:  (Selected)   Outpatient Medications  Ordered  Flublok Quadrivalent [id number]: 0.5 mL, IM, once  Prescriptions  Prescribed  atorvastatin 10 mg oral tablet: = 1 tab(s), Oral, qhs, # 30 tab(s), 0 Refill(s), Type: Maintenance, Pharmacy: [facility] #[id number], Pt due for annual check up and labs for further refills, 68, in, 08/26/20 8:51:00 CDT, Height Measured, 169.8, lb, 06/29/20 10:13:00 CDT, Weight...  hydroCHLOROthiazide 25 mg oral tablet: = 1 tab(s), Oral, daily, # 30 tab(s), 0 Refill(s), Type: Maintenance, Pharmacy: [facility] #[id number], 68, in, 08/26/20 8:51:00 CDT, Height Measured, 169.8, lb, 06/29/20 10:13:00 CDT, Weight Measured  levothyroxine 100 mcg (0.1 mg) oral tablet: = 1 tab(s), Oral, daily, # 30 tab(s), 0 Refill(s), Type: Maintenance, Pharmacy: [facility] #[id number], 68, in, 08/26/20 8:51:00 CDT, Height Measured, 169.8, lb, 06/29/20 10:13:00 CDT, Weight Measured  losartan 100 mg oral tablet: = 1 tab(s), Oral, daily, # 30 tab(s), 0 Refill(s), Type: Maintenance, Pharmacy: [facility] #[id number], Pt due for annual check up and labs for further refills, 68, in, 08/26/20 8:51:00 CDT, Height Measured, 169.8, lb, 06/29/20 10:13:00 CDT, Daiana...    Problem list:    All Problems  Osteoporosis / SNOMED CT 666142635 / Confirmed  Macular degeneration, dry / SNOMED CT 7484659723 / Confirmed  Hypothyroidism / SNOMED CT 272737000 / Confirmed  Lipid metabolism disorder / SNOMED CT 865621162 / Confirmed  Essential hypertension / SNOMED CT 11907972 / Confirmed     Other Healthcare:         Histories   Past Medical History:    Active  Essential hypertension (88982939)  Hypothyroidism (634319846)  Lipid metabolism disorder (179878670)   Family History:       Procedure history:    Cataract extraction and insertion of intraocular lens (SNOMED CT 5939690377) on 8/28/2018 at 72 Years.  Comments:  9/13/2018 8:25 AM CDT - Peyton Smith  Right.  Cataract extraction and insertion of intraocular lens (SNOMED CT 3864602698) on 3/19/2015 at 69 Years.  Comments:  3/26/2015 9:37 AM CDT - Peyton Smith  Left.  Colonoscopy (SNOMED CT 216128663) in 2012 at 66 Years.  Repair of rectal prolapse (SNOMED CT 04854532) on 2/6/2012 at 66 Years.  Hysterectomy (SNOMED CT 084095440).  Appendectomy (SNOMED CT 476783619).   Social History:        Alcohol Assessment            Current, social, 3 drinks/episode maximum.      Tobacco Assessment            Past      Substance Abuse Assessment            Never      Employment and Education Assessment            Retired      Home and Environment Assessment            5 children.      Exercise and Physical Activity Assessment            Exercise frequency: 5-6 times/week.  Exercise type: Walking.      Sexual Assessment            Sexually active: No.        Physical Examination   Vital Signs   9/16/2020 7:56 AM CDT Temperature Tympanic 98.3 DegF    Peripheral Pulse Rate 78 bpm    Pulse Site Radial artery    HR Method Manual    Systolic Blood Pressure 140 mmHg  HI    Diastolic Blood Pressure 80 mmHg    Mean Arterial Pressure 100 mmHg    BP Site Right arm    BP Method Manual    Oxygen Saturation 95 %      Measurements from flowsheet : Measurements    9/16/2020 7:56 AM CDT Height Measured - Standard 68 in    Weight Measured - Standard 169.6 lb    BSA 1.92 m2    Body Mass Index 25.78 kg/m2  HI         Review / Management   Results review:  Lab results   9/1/2020 9:29 AM CDT Glucose Level 104 mg/dL  HI    BUN 19 mg/dL    Creatinine Level 0.90 mg/dL    BUN/Creat Ratio NOT APPLICABLE    eGFR 63 mL/min/1.73m2    eGFR African American 72 mL/min/1.73m2    Calcium Level 9.5 mg/dL    Cholesterol 178 mg/dL    Non-HDL Cholesterol 133  HI    HDL 45 mg/dL  LOW    Cholesterol/HDL Ratio 4.0      HI    Triglyceride 147 mg/dL    TSH 3.35 mIU/L   .         Interpretation: Labs unremarkable, Consistent with previous results.       Impression and Plan   Diagnosis     Medicare annual wellness visit, subsequent (GUI09-HT Z09).     Patient Instructions:       Counseled: Regarding diagnosis, Regarding medications, Smoking cessation, Verbalized understanding, Breast cancer, colon cancer, diabetes, lipid, HTN, obesity screening discussed and initiated, Risk factors for development of chronic disease and infirmities of ageing have been discussed and plans for minimizing and screening for these conditions have been discussed.  Plans in place for management of conditions identified.  The preventative screening checklist has been completed and reviewed with the patient and a copy has been filled in the electronic record..    Diagnosis     Encounter for immunization (ULO38-TU Z23).     Essential hypertension (XAY41-HO I10).     Hypothyroidism (LJT19-LT E03.4).     Lipid metabolism disorder (NRV10-HS E78.2).     Course:  Progressing as expected.    Plan:  continue current medication.         Follow-up: Return to clinic, In 6 months.

## 2022-02-15 NOTE — CARE COORDINATION
Pt appears on  [facility] chronic disease panel as out of parameters for elevated BP.  Spoke with Daysi 07/25/19 at 0309pm.  She agreed to have a [facility] only BP check when she is here for [facility] on 07/30/19    Jody Santana CMA.

## 2022-02-15 NOTE — NURSING NOTE
Placed ABPM on @ 1457 per Dr. Churchill for dx essential HTN, Hypothyroidism, white coat hypertension. Gave patient diary, written and verbal instructions, and told to return in 24 hrs. Patient indicated she understood.   independent

## 2022-02-15 NOTE — CARE COORDINATION
ACTION PLAN   Goal(s): Hypertension management   Other concerns hypothyroid and macular degeneration to try to keep stable         Today s Date:              05/31/2017                          Goal(s) completion date:      Steps to be taken  High blood pressure  When will I accomplish these steps Barriers Status    Talking medications as prescribed.   Anxious when she comes into clinic.  Needs to sit for a bit before we retake her blood pressure.   05/31/2017  Essential hypertension  Increase losartan to 50 mg daily  Follow-up in 1 month  We will have blood pressure checked early next week  Recent Blood pressure. Improving and discussed goal.    176/95 P66  165/92 P69  Feeling well and doing well on new meds.  Will stop in again next Monday for BP check.   06/09/2017  Blood pressures are still running high.   Dr. KNOWLES wanted her to take 100mg of losartan a day.  She has enough at home to double her dose and then we will check again next Friday  .  10/24/2017  Daysi is doing well.    Impression and Plan   Diagnosis     Hypertension (OAT00-LS I10).     Course:  Progressing as expected, Well controlled.    Plan:  Enroll in exercise program, Monitor blood pressure, Weight monitoring, continue current medication.         Diet: Low cholesterol, Sodium restricted.    Patient Instructions:       Counseled: Regarding treatment ( Hypertension ( Diet management, Medication management, Physical activity, Weight management ) ).    Her son has moved back to this area and she is happy about this.  Will be closer to grandchildren.       Steps to be taken When will I accomplish these steps Barriers Status

## 2022-02-15 NOTE — LETTER
(Inserted Image. Unable to display)   September 29, 2020      MARIAJOSE FAJARDO  [address]    [place], [place] [id number]        Dear MARIAJOSE,      Thank you for selecting [facility] for your healthcare needs.       This letter is to inform you that we have received a copy of your mammogram results.  Your results were normal unless noted below.  You will also receive notice of your results from the radiologist within 7-10 days.  This letter is to let you know I have also received a copy and it is filed in your clinic chart.      Please plan on having your next mammogram done in 12 months.          Please contact me or my assistant at [phone] if you have any questions or concerns.     Sincerely,        Nelson Churchill MD

## 2022-02-15 NOTE — TELEPHONE ENCOUNTER
---------------------  From: Muriel Nava RN (Phone Messages Pool ([id number]_[facility]))   To: [facility] Message Pool ([id number]);     Sent: 10/27/2021 9:30:31 AM CDT  Subject: BP medication       PCP:  BENSON      Time of Call: 9:10am       Person Calling:  pt  Phone number:  [phone]    Returned call at: 9:25am    Note:  VM received from pt, stating she 'cannot take these pills anymore. They make me sick.'    TC with pt, pt stated she has been so dizzy and is losing her balance. Stated Amlodipine is the new BP med and is making her sick. Stated last week, her BP was 107/69.  Advised on slow position changes and getting plenty of fluids. Informed pt, message will be passed to [facility].     Please advise.    Last office visit and reason:  9/20/21 AWV/CDM [facility]---------------------  From: Selma Mclean MA ([facility] Message Pool ([id number]_[facility]))   To: Nelson Churchill MD;     Sent: 10/27/2021 9:44:13 AM CDT  Subject: FW: BP medication---------------------  From: Nelson Churchill MD   To: [employer] ([email or web address]) Message Pool ([id number] [facility] [place]);     Sent: 10/27/2021 11:44:31 AM CDT  Subject: RE: BP medication     OK to stop amlodipine, continue to monitor BPPt informed of [facility]  recommendation

## 2022-02-15 NOTE — PROGRESS NOTES
Chief Complaint    Formerly [facility]medicare  History of Present Illness      General health status:  good      Diet:  regular      Exercise:  stays active at home      Medical encounters:  none      Dental exam:  last year      Eye exam:  appt in January      Caffeine use:  daily      Tobacco use:  no      Alcohol use:  occasional      Lipid and diabetes screening:  due      Colon cancer screening:  n/a      Mammogram:  due      Bone health:  osteoporosis in 2018, due for repeat DEXA      Pap smear:  n/a      LMP:  n/a      Immunizations:  declines influenza and SARS-CoV-2 vaccines      Other concerns:  vision decreasing      Chronic disease:  HTN, HLD, hypothyroidism controlled  Review of Systems          Constitutional:  No fever, No chills, No sweats, No weakness, No fatigue.            Eye:  No recent visual problem.            Ear/Nose/Mouth/Throat:  No decreased hearing, No nasal congestion, No sore throat.            Respiratory:  No shortness of breath, No cough.            Cardiovascular:  Peripheral edema, No chest pain, No palpitations.            Gastrointestinal:  No nausea, No vomiting, No diarrhea, No constipation, No heartburn.            Genitourinary:  No dysuria, No change in urine stream.            Hematology/Lymphatics:  No bruising tendency, No bleeding tendency.            Endocrine:  No cold intolerance, No heat intolerance.            Immunologic:  Negative.            Musculoskeletal:  No back pain, No neck pain, No joint pain, No muscle pain.            Integumentary:  Dryness, Skin lesion, No rash.            Neurologic:  Alert and oriented X4, No headache.          Cognitive impairment:          Year:  ok          Date:  ok          City: ok           Psychiatric:  No anxiety, No depression.                       PHQ9 and CAGE reviewed and discussed with patient.                       Hearing:  decreased, wears hearing aids          Vision:  20/50          ADL: no concerns, completely independent           Fall risk: none          Home safety:  no concerns                     Advanced care planning:  completed   Physical Exam   Vitals & Measurements    T: 97.6  F (Tympanic)  HR: 72 (Peripheral)  BP: 166/94     HT: 68 in  WT: 168 lb  BMI: 25.54           General:  Alert and oriented, No acute distress.            Eye:  Normal conjunctiva, Vision unchanged.            HENT:  Normocephalic, Tympanic membranes are clear, Oral mucosa is moist, No pharyngeal erythema.            Neck:  Supple, Non-tender, No carotid bruit, No lymphadenopathy, No thyromegaly.            Respiratory:  Lungs are clear to auscultation, Respirations are non-labored.            Cardiovascular:  Normal rate, Regular rhythm, Normal peripheral perfusion.            Gastrointestinal:  Soft, Non-tender.            Genitourinary:  No costovertebral angle tenderness.            Musculoskeletal:  Normal range of motion, Normal strength.            Integumentary:  Warm, Dry, No rash.            Neurologic:  Alert, Oriented.            Psychiatric:  Cooperative, Appropriate mood & affect.    Assessment/Plan       1. Medicare annual wellness visit, subsequent (Z09)         Discussed diet, weight management, BMI, activity and exercise        Follow up in one year        Risk factors for development of chronic disease and infirmities of ageing have been discussed and plans for minimizing and screening for these conditions have been discussed.  Plans in place for management of conditions identified.  The preventive services checklist has been reviewed with the patient and a copy has been placed in the electronic record.  Prostate cancer, colon cancer, diabetes, lipid, HTN, obesity screening discussed and initiated                2. Essential hypertension (I10)         not controlled, continue current medication and add low dose amlodipine        report home BP in the next two weeks         Ordered:          Basic Metabolic Panel* ([facility]), Specimen Type:  Serum, Collection Date: 09/20/21 11:59:00 CDT                3. 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 Osteoporosis (M81.0)         check DEXA         Ordered:          DEXA Scan (Request), Osteoporosis                Orders:         amLODIPine, = 1 tab(s) ( 5 mg ), Oral, daily, # 30 tab(s), 2 Refill(s), Type: Maintenance, Pharmacy: [facility] #[id number], 1 tab(s) Oral daily, 68, in, 09/20/21 11:22:00 CDT, Height Measured, 168, lb, 09/20/21 11:22:00 CDT, Weight Measured, (Ordered)         atorvastatin, = 1 tab(s) ( 10 mg ), Oral, qhs, x 90 day(s), # 90 tab(s), 3 Refill(s), Type: Hard Stop, Pharmacy: [facility] #[id number], 68, in, 09/16/20 7:56:00 CDT, Height Measured, 169.6, lb, 09/16/20 7:56:00 CDT, Weight Measured, (Completed)         atorvastatin, = 1 tab(s) ( 10 mg ), Oral, qhs, # 90 tab(s), 3 Refill(s), Type: Maintenance, Pharmacy: [facility] #[id number], 1 tab(s) Oral qhs,x90 day(s), 68, in, 09/20/21 11:22:00 CDT, Height Measured, 168, lb, 09/20/21 11:22:00 CDT, Weight Measured, (Ordered)         hydroCHLOROthiazide, = 1 tab(s), Oral, daily, # 90 tab(s), 3 Refill(s), Type: Maintenance, Pharmacy: [facility] #[id number], 1 tab(s) Oral daily, 68, in, 09/20/21 11:22:00 CDT, Height Measured, 168, lb, 09/20/21 11:22:00 CDT, Weight Measured, (Ordered)         hydroCHLOROthiazide, = 1 tab(s), Oral, daily, # 90 tab(s), 3 Refill(s), Type: Hard Stop, Pharmacy: [facility] #[id number], 68, in, 09/16/20 7:56:00 CDT, Height Measured, 169.6, lb, 09/16/20 7:56:00 CDT, Weight Measured, (Completed)          levothyroxine, = 1 tab(s), Oral, daily, # 90 tab(s), 3 Refill(s), Type: Hard Stop, Pharmacy: [facility] #[id number], 68, in, 09/16/20 7:56:00 CDT, Height Measured, 169.6, lb, 09/16/20 7:56:00 CDT, Weight Measured, (Completed)         levothyroxine, = 1 tab(s), Oral, daily, # 90 tab(s), 3 Refill(s), Type: Maintenance, Pharmacy: [facility] #[id number], 1 tab(s) Oral daily, 68, in, 09/20/21 11:22:00 CDT, Height Measured, 168, lb, 09/20/21 11:22:00 CDT, Weight Measured, (Ordered)         losartan, = 1 tab(s), Oral, daily, # 90 tab(s), 3 Refill(s), Type: Hard Stop, Pharmacy: [facility] #[id number], 68, in, 09/16/20 7:56:00 CDT, Height Measured, 169.6, lb, 09/16/20 7:56:00 CDT, Weight Measured, (Completed)         losartan, = 1 tab(s), Oral, daily, # 90 tab(s), 3 Refill(s), Type: Maintenance, Pharmacy: [facility] #[id number], 1 tab(s) Oral daily, 68, in, 09/20/21 11:22:00 CDT, Height Measured, 168, lb, 09/20/21 11:22:00 CDT, Weight Measured, (Ordered)         Return to Clinic (Request), RFV: Yearly exam/AWV, Return in 1 year  Patient Information     Name:MARIAJOSE FAJARDO      Address:      [address] [place] [id number]     Sex:Female     YOB: 1945     [phone]     Emergency Contact:MONIQUE NIETO     MRN:[id number]     FIN:[id number]     Location:[facility]     Date of Service:09/20/2021      Primary Care Physician:       Nelson Churchill MD, [phone]      Attending Physician:       Nelson Churchill MD, [phone]  Problem List/Past Medical History    Ongoing     Essential hypertension     Hypothyroidism     Lipid metabolism disorder     Macular degeneration, dry     Osteoporosis    Historical     No qualifying data  Procedure/Surgical History     Cataract extraction and insertion of intraocular lens (08/28/2018)      Comments: Right..     Cataract extraction and insertion of intraocular lens (03/19/2015)      Comments: Left..     Repair of  rectal prolapse (02/06/2012)     Colonoscopy (2012)     Appendectomy     Hysterectomy  Medications    amLODIPine 5 mg oral tablet, 5 mg= 1 tab(s), Oral, daily, 2 refills    atorvastatin 10 mg oral tablet, 10 mg= 1 tab(s), Oral, qhs, 3 refills    hydroCHLOROthiazide 25 mg oral tablet, 1 tab(s), Oral, daily, 3 refills    levothyroxine 100 mcg (0.1 mg) oral tablet, 1 tab(s), Oral, daily, 3 refills    losartan 100 mg oral tablet, 1 tab(s), Oral, daily, 3 refills  Allergies    alendronate (itchiness)  Social History    Smoking Status     Former smoker     Alcohol      Current, social, 3 drinks/episode maximum.     Electronic Cigarette/Vaping      Electronic Cigarette Use: Never.     Employment/School      Retired     Exercise      Exercise frequency: 5-6 times/week. Exercise type: Walking.     Home/Environment      5 children.     Sexual      Sexually active: No.     Substance Abuse      Never     Tobacco      Former smoker, quit more than 30 days ago  Family History    Sister: History is negative    Brother: History is negative    Brother: History is unknown  Immunizations       Scheduled Immunizations       Dose Date(s)       influenza       10/03/2016       influenza virus vaccine, inactivated       10/12/2015, 10/03/2016, 09/18/2017, 09/23/2003, 11/22/2005, 10/17/2006, 10/28/2018, 10/22/2019       Td       04/07/1994, 09/23/2003       tetanus/diphth/pertuss (Tdap) adult/adol       03/13/2019       Other Immunizations               influenza virus vaccine, inactivated       09/16/2020       pneumococcal (PPSV23)       03/02/2017       ZOS, shingles       03/14/2016       pneumococcal (PCV13)       03/14/2016

## 2022-02-15 NOTE — PROGRESS NOTES
Chief Complaint    HTN f/u--had recent Losartan dose increase.  History of Present Illness      Patient is here for follow-up of her hypertension.  She had a he was a blood pressure monitor done which showed elevated blood pressures in the day and night.  She is currently on losartan 100 mg daily.  She has had increased stressors recently with moving to a new apartment.  Her blood pressure has been variable.  Denies shortness of breath, chest pain, dyspnea on exertion.  Review of Systems      See HPI.  All other review of systems negative.  Physical Exam   Vitals & Measurements    T: 98(Tympanic)  BP: 154/78     HT: 68 in  WT: 172.6 lb  BMI: 26.24       Alert, oriented, no acute distress       Neck is supple without adenopathy, thyromegaly, carotid bruit       Heart has regular rate and rhythm       Lungs are clear  Assessment/Plan       Essential hypertension         Hypertension is not under control at this time.  We will add hydrochlorothiazide 25 mg daily and she will return in about 2 weeks for blood pressure check.       Orders:         hydroCHLOROthiazide, 1 tab(s), po, daily, # 30 tab(s), 1 Refill(s), Type: Soft Stop, Pharmacy: [id number], 1 tab(s) po daily  Problem List/Past Medical History    Ongoing     Essential hypertension     Hypothyroidism     Lipid metabolism disorder     Macular degeneration, dry    Historical  Procedure/Surgical History     Cataract extraction and insertion of intraocular lens (03/19/2015)     Repair of rectal prolapse (02/06/2012)     Appendectomy     Hysterectomy  Medications    atorvastatin 10 mg oral tablet, 1 tab(s), po, hs, 2 refills    levothyroxine 100 mcg (0.1 mg) oral tablet, 1 tab(s), po, daily, 2 refills    losartan 100 mg oral tablet, 100 mg= 1 tab(s), po, daily  Allergies    No known allergies  Social History    Smoking Status - 07/24/2017     Former smoker     Alcohol - 03/18/2015      Current, social, 3 drinks/episode maximum.     Employment and  Education - 03/18/2015      Retired     Exercise and Physical Activity - 03/18/2015      Exercise frequency: Daily. Exercise type: Walking.     Home and Environment - 03/18/2015      5 children.     Sexual - 03/18/2015      Sexually active: No.     Substance Abuse - 03/18/2015      Never     Tobacco - 03/18/2015      Past  Immunizations      Vaccine Date Status Comments      pneumococcal (PPSV23) 03/02/2017 Given      influenza virus vaccine, inactivated 10/03/2016 Recorded      influenza 10/03/2016 Recorded [facility]      pneumococcal (PCV13) 03/14/2016 Given      ZOS, shingles 03/14/2016 Recorded      influenza virus vaccine, inactivated 10/12/2015 Recorded [10/13/2015] Pauly Thapa  Lab Results   Chemistry panel reviewed.  Creatinine is normal.  Glucose slightly elevated at 134  Diagnostic Results   Reviewed ambulatory blood pressure monitor results

## 2022-02-15 NOTE — CARE COORDINATION
ACTION PLAN   Goal(s): Hypertension management   Other concerns hypothyroid and macular degeneration to try to keep stable         Today s Date:              05/31/2017                          Goal(s) completion date:      Steps to be taken  High blood pressure  When will I accomplish these steps Barriers Status      05/31/2017  Essential hypertension  Increase losartan to 50 mg daily  Follow-up in 1 month  We will have blood pressure checked early next week  Recent Blood pressure. Improving and discussed goal.    176/95 P66  165/92 P69  Feeling well and doing well on new meds.  Will stop in again next Monday for BP check.      Steps to be taken When will I accomplish these steps Barriers Status             Steps to be taken When will I accomplish these steps Barriers Status             Steps to be taken When will I accomplish these steps Barriers Status

## 2022-02-15 NOTE — PROGRESS NOTES
Chief Complaint    c/o woodtick bite since Saturday.  noticed increased redness last night.  History of Present Illness      Patient is here with complaints of redness around the tick bite on her abdomen.  She pulled a tick off on Saturday.  Redness increased over the last 24 hours.  She denies fever, chills, sweats, myalgia, arthralgia.  She identified the tick is a wood tick.  Review of Systems      See HPI.  All other review of systems negative.  Physical Exam   Vitals & Measurements    T: 98.4   F (Tympanic)  HR: 72(Peripheral)  BP: 148/88     HT: 68 in  WT: 169.8 lb  BMI: 25.82       Alert, oriented, no acute distress       Lungs are clear       Heart has a regular rhythm        There is local reaction with a fairly large area of surrounding erythema above the umbilicus.        Cooperative, appropriate affect  Assessment/Plan       1. Tick bite (W57.XXXA)         Tick bite with associated cellulitis         Treat with cephalexin         Discussed signs and symptoms of Lyme disease and follow-up if symptoms not improving the next 48 hours       Orders:         cephalexin, = 1 tab(s) ( 500 mg ), Oral, tid, x 7 day(s), # 21 tab(s), 0 Refill(s), Type: Acute, Pharmacy: [facility] #[id number], 1 tab(s) Oral tid,x7 day(s), 68, in, 06/29/20 10:13:00 CDT, Height Measured, 169.8, lb, 06/29/20 10:13:00 CDT, Weight Measured, (Ordered)  Patient Information     Name:MARIAJOSE FAJARDO      Address:      [address] [place] [phone]     Sex:Female     YOB: 1945     [phone]     Emergency Contact:MONIQUE NIETO     MRN:[id number]     FIN:[id number]     Location:[facility]     Date of Service:06/29/2020      Primary Care Physician:       Nelson Churchill MD, [phone]      Attending Physician:       Nelson Churchill MD, [phone]  Problem List/Past Medical History    Ongoing     Essential hypertension     Hypothyroidism     Lipid metabolism disorder      Macular degeneration, dry     Osteoporosis    Historical     No qualifying data  Procedure/Surgical History     Cataract extraction and insertion of intraocular lens (08/28/2018)      Comments: Right..     Cataract extraction and insertion of intraocular lens (03/19/2015)      Comments: Left..     Repair of rectal prolapse (02/06/2012)     Colonoscopy (2012)     Appendectomy     Hysterectomy  Medications    atorvastatin 10 mg oral tablet, 1 tab(s), Oral, qhs    cephalexin 500 mg oral tablet, 500 mg= 1 tab(s), Oral, tid    hydroCHLOROthiazide 25 mg oral tablet, 1 tab(s), Oral, daily    levothyroxine 100 mcg (0.1 mg) oral tablet, 1 tab(s), Oral, daily    losartan 100 mg oral tablet, 1 tab(s), Oral, daily  Allergies    alendronate (itchiness)  Social History    Smoking Status - 06/29/2020     Former smoker     Alcohol      Current, social, 3 drinks/episode maximum., 03/18/2015     Employment/School      Retired, 03/18/2015     Exercise      Exercise frequency: 5-6 times/week. Exercise type: Walking., 03/12/2018     Home/Environment      5 children., 03/18/2015     Sexual      Sexually active: No., 03/18/2015     Substance Abuse      Never, 03/18/2015     Tobacco      Past, 03/18/2015  Family History    Sister: History is negative    Brother: History is negative    Brother: History is unknown  Immunizations      Vaccine Date Status          influenza virus vaccine, inactivated 09/18/2017 Recorded          pneumococcal (PPSV23) 03/02/2017 Given          influenza virus vaccine, inactivated 10/03/2016 Recorded          influenza 10/03/2016 Recorded              Comments : [facility]          pneumococcal (PCV13) 03/14/2016 Given          ZOS shingles 03/14/2016 Recorded          influenza virus vaccine, inactivated 10/12/2015 Recorded              Comments : [10/13/2015] Pauly Thapa

## 2022-02-15 NOTE — NURSING NOTE
Comprehensive Intake Entered On:  8/26/2020 8:54 AM CDT    Performed On:  8/26/2020 8:51 AM CDT by Sonia Ahuja CMA               Summary   Chief Complaint :   phone visit: upset stomach, dizziness, dieaherra, cold and clammy, verbal consent for visit patients brother is with her.    Height Measured :   68 in(Converted to: 5 ft 8 in, 172.72 cm)    Sonia Ahuja CMA - 8/26/2020 8:51 AM CDT   Health Status   Allergies Verified? :   Yes   Medication History Verified? :   Yes   Immunizations Current :   Yes   Medical History Verified? :   Yes   Pre-Visit Planning Status :   Completed   Tobacco Use? :   Former smoker   Sonia Ahuja CMA - 8/26/2020 8:51 AM CDT   Consents   Consent for Immunization Exchange :   Consent Granted   Consent for Immunizations to Providers :   Consent Granted   Sonia Ahuja CMA - 8/26/2020 8:51 AM CDT   Meds / Allergies   (As Of: 8/26/2020 8:54:46 AM CDT)   Allergies (Active)   alendronate  Estimated Onset Date:   Unspecified ; Reactions:   itchiness ; Created By:   Selma Mclean MA; Reaction Status:   Active ; Category:   Drug ; Substance:   alendronate ; Type:   Allergy ; Updated By:   Selma Mclean MA; Source:   Patient ; Reviewed Date:   8/26/2020 8:54 AM CDT        Medication List   (As Of: 8/26/2020 8:54:46 AM CDT)   Prescription/Discharge Order    atorvastatin  :   atorvastatin ; Status:   Prescribed ; Ordered As Mnemonic:   atorvastatin 10 mg oral tablet ; Simple Display Line:   1 tab(s), Oral, qhs, 90 tab(s), 0 Refill(s) ; Ordering Provider:   Nelson Churchill MD; Catalog Code:   atorvastatin ; Order Dt/Tm:   6/1/2020 2:11:38 PM CDT          hydroCHLOROthiazide  :   hydroCHLOROthiazide ; Status:   Prescribed ; Ordered As Mnemonic:   hydroCHLOROthiazide 25 mg oral tablet ; Simple Display Line:   1 tab(s), Oral, daily, 30 tab(s), 0 Refill(s) ; Ordering Provider:   Nelson Churchill MD; Catalog Code:   hydroCHLOROthiazide ; Order Dt/Tm:   7/29/2020 10:59:43 AM CDT           levothyroxine  :   levothyroxine ; Status:   Prescribed ; Ordered As Mnemonic:   levothyroxine 100 mcg (0.1 mg) oral tablet ; Simple Display Line:   1 tab(s), Oral, daily, 30 tab(s), 0 Refill(s) ; Ordering Provider:   Nelson Churchill MD; Catalog Code:   levothyroxine ; Order Dt/Tm:   7/29/2020 11:01:18 AM CDT          losartan  :   losartan ; Status:   Prescribed ; Ordered As Mnemonic:   losartan 100 mg oral tablet ; Simple Display Line:   1 tab(s), Oral, daily, 90 tab(s), 0 Refill(s) ; Ordering Provider:   Nelson Churchill MD; Catalog Code:   losartan ; Order Dt/Tm:   6/1/2020 2:12:38 PM CDT            ID Risk Screen   Recent Travel History :   No recent travel   Family Member Travel History :   No recent travel   Other Exposure to Infectious Disease :   Unknown   Sonia Ahuja CMA - 8/26/2020 8:51 AM CDT

## 2022-05-17 ENCOUNTER — APPOINTMENT (OUTPATIENT)
Dept: AUDIOLOGY | Facility: CLINIC | Age: 77
End: 2022-05-17
Payer: COMMERCIAL

## 2022-09-12 DIAGNOSIS — I10 HTN (HYPERTENSION): ICD-10-CM

## 2022-09-12 DIAGNOSIS — E03.9 ACQUIRED HYPOTHYROIDISM: Primary | ICD-10-CM

## 2022-09-12 DIAGNOSIS — E78.2 MIXED HYPERLIPIDEMIA: ICD-10-CM

## 2022-09-12 RX ORDER — ATORVASTATIN CALCIUM 10 MG/1
10 TABLET, FILM COATED ORAL DAILY
Qty: 90 TABLET | Refills: 0 | Status: SHIPPED | OUTPATIENT
Start: 2022-09-12 | End: 2022-09-28

## 2022-09-12 RX ORDER — LEVOTHYROXINE SODIUM 100 UG/1
100 TABLET ORAL DAILY
Qty: 90 TABLET | Refills: 0 | Status: SHIPPED | OUTPATIENT
Start: 2022-09-12 | End: 2022-09-28

## 2022-09-12 RX ORDER — HYDROCHLOROTHIAZIDE 25 MG/1
25 TABLET ORAL DAILY
Qty: 90 TABLET | Refills: 0 | Status: SHIPPED | OUTPATIENT
Start: 2022-09-12 | End: 2022-09-28

## 2022-09-12 NOTE — TELEPHONE ENCOUNTER
Prescription approved per [facility] Refill Protocol.    Last Written Prescription Date: 9/20/21  Last Fill Quantity: 90,  # refills: 3   Last office visit: 9/20/21   Future Office Visit:   Next 5 appointments (look out 90 days)    Dec 06, 2022  8:30 AM  Return Visit with Haley Rodriguez  [facility] [address] [place] [id number] [phone]

## 2022-09-27 PROBLEM — E03.4 ACQUIRED ATROPHY OF THYROID: Status: ACTIVE | Noted: 2022-09-27

## 2022-09-27 PROBLEM — H35.3190 NONEXUDATIVE AGE-RELATED MACULAR DEGENERATION: Status: ACTIVE | Noted: 2022-09-27

## 2022-09-27 PROBLEM — I10 PRIMARY HYPERTENSION: Status: ACTIVE | Noted: 2022-09-27

## 2022-09-27 RX ORDER — AMLODIPINE BESYLATE 5 MG/1
5 TABLET ORAL DAILY
COMMUNITY
Start: 2021-09-20 | End: 2022-09-28

## 2022-09-27 RX ORDER — LOSARTAN POTASSIUM 100 MG/1
100 TABLET ORAL DAILY
COMMUNITY
Start: 2022-06-14 | End: 2022-09-28

## 2022-09-28 ENCOUNTER — OFFICE VISIT (OUTPATIENT)
Dept: FAMILY MEDICINE | Facility: CLINIC | Age: 77
End: 2022-09-28
Payer: COMMERCIAL

## 2022-09-28 VITALS
TEMPERATURE: 97.3 F | HEIGHT: 68 IN | WEIGHT: 166.8 LBS | HEART RATE: 67 BPM | BODY MASS INDEX: 25.28 KG/M2 | DIASTOLIC BLOOD PRESSURE: 88 MMHG | SYSTOLIC BLOOD PRESSURE: 148 MMHG

## 2022-09-28 DIAGNOSIS — E03.4 HYPOTHYROIDISM DUE TO ACQUIRED ATROPHY OF THYROID: ICD-10-CM

## 2022-09-28 DIAGNOSIS — Z00.00 ENCOUNTER FOR MEDICARE ANNUAL WELLNESS EXAM: Primary | ICD-10-CM

## 2022-09-28 DIAGNOSIS — E78.2 MIXED HYPERLIPIDEMIA: ICD-10-CM

## 2022-09-28 DIAGNOSIS — Z23 NEED FOR VACCINATION: ICD-10-CM

## 2022-09-28 DIAGNOSIS — I10 PRIMARY HYPERTENSION: ICD-10-CM

## 2022-09-28 DIAGNOSIS — H35.3130 BILATERAL NONEXUDATIVE AGE-RELATED MACULAR DEGENERATION, UNSPECIFIED STAGE: ICD-10-CM

## 2022-09-28 LAB
ANION GAP SERPL CALCULATED.3IONS-SCNC: 11 MMOL/L (ref 7–15)
BUN SERPL-MCNC: 19.2 MG/DL (ref 8–23)
CALCIUM SERPL-MCNC: 9.5 MG/DL (ref 8.8–10.2)
CHLORIDE SERPL-SCNC: 102 MMOL/L (ref 98–107)
CHOLEST SERPL-MCNC: 206 MG/DL
CREAT SERPL-MCNC: 0.78 MG/DL (ref 0.51–0.95)
DEPRECATED HCO3 PLAS-SCNC: 28 MMOL/L (ref 22–29)
GFR SERPL CREATININE-BSD FRML MDRD: 78 ML/MIN/1.73M2
GLUCOSE SERPL-MCNC: 110 MG/DL (ref 70–99)
HDLC SERPL-MCNC: 55 MG/DL
LDLC SERPL CALC-MCNC: 125 MG/DL
NONHDLC SERPL-MCNC: 151 MG/DL
POTASSIUM SERPL-SCNC: 3.4 MMOL/L (ref 3.4–5.3)
SODIUM SERPL-SCNC: 141 MMOL/L (ref 136–145)
TRIGL SERPL-MCNC: 130 MG/DL
TSH SERPL DL<=0.005 MIU/L-ACNC: 4.44 UIU/ML (ref 0.3–4.2)

## 2022-09-28 PROCEDURE — 80061 LIPID PANEL: CPT | Performed by: FAMILY MEDICINE

## 2022-09-28 PROCEDURE — 80048 BASIC METABOLIC PNL TOTAL CA: CPT | Performed by: FAMILY MEDICINE

## 2022-09-28 PROCEDURE — 99214 OFFICE O/P EST MOD 30 MIN: CPT | Mod: 25 | Performed by: FAMILY MEDICINE

## 2022-09-28 PROCEDURE — 84443 ASSAY THYROID STIM HORMONE: CPT | Performed by: FAMILY MEDICINE

## 2022-09-28 PROCEDURE — G0439 PPPS, SUBSEQ VISIT: HCPCS | Performed by: FAMILY MEDICINE

## 2022-09-28 PROCEDURE — 36415 COLL VENOUS BLD VENIPUNCTURE: CPT | Performed by: FAMILY MEDICINE

## 2022-09-28 RX ORDER — ATORVASTATIN CALCIUM 10 MG/1
10 TABLET, FILM COATED ORAL DAILY
Qty: 90 TABLET | Refills: 3 | Status: SHIPPED | OUTPATIENT
Start: 2022-09-28 | End: 2023-10-02

## 2022-09-28 RX ORDER — HYDROCHLOROTHIAZIDE 25 MG/1
25 TABLET ORAL DAILY
Qty: 90 TABLET | Refills: 3 | Status: SHIPPED | OUTPATIENT
Start: 2022-09-28 | End: 2023-10-02

## 2022-09-28 RX ORDER — LEVOTHYROXINE SODIUM 100 UG/1
100 TABLET ORAL DAILY
Qty: 90 TABLET | Refills: 3 | Status: SHIPPED | OUTPATIENT
Start: 2022-09-28 | End: 2023-10-02

## 2022-09-28 RX ORDER — LOSARTAN POTASSIUM 100 MG/1
100 TABLET ORAL DAILY
Qty: 90 TABLET | Refills: 3 | Status: SHIPPED | OUTPATIENT
Start: 2022-09-28 | End: 2023-10-02

## 2022-09-28 ASSESSMENT — ENCOUNTER SYMPTOMS
ARTHRALGIAS: 0
DYSURIA: 0
EYE PAIN: 0
CHILLS: 0
SHORTNESS OF BREATH: 0
HEMATOCHEZIA: 0
COUGH: 0
PARESTHESIAS: 0
HEARTBURN: 1
JOINT SWELLING: 0
NERVOUS/ANXIOUS: 0
NAUSEA: 0
BREAST MASS: 0
FEVER: 0
DIZZINESS: 0
SORE THROAT: 0
MYALGIAS: 0
CONSTIPATION: 0
HEADACHES: 0
FREQUENCY: 0
PALPITATIONS: 0
HEMATURIA: 0
WEAKNESS: 0
ABDOMINAL PAIN: 0
DIARRHEA: 0

## 2022-09-28 ASSESSMENT — ACTIVITIES OF DAILY LIVING (ADL): CURRENT_FUNCTION: NO ASSISTANCE NEEDED

## 2022-09-28 NOTE — LETTER
September 28, 2022      Daysi Harley  [address]   [place] [address]        Dear ,    We are writing to inform you of your test results.    Your test results fall within the expected range(s) or remain unchanged from previous results.  Please continue with current treatment plan.    Resulted Orders   Lipid panel reflex to direct LDL Fasting   Result Value Ref Range    Cholesterol 206 (H) <200 mg/dL    Triglycerides 130 <150 mg/dL    Direct Measure HDL 55 >=50 mg/dL    LDL Cholesterol Calculated 125 (H) <=100 mg/dL    Non HDL Cholesterol 151 (H) <130 mg/dL    Narrative    Cholesterol  Desirable:  <200 mg/dL    Triglycerides  Normal:  Less than 150 mg/dL  Borderline High:  150-199 mg/dL  High:  200-499 mg/dL  Very High:  Greater than or equal to 500 mg/dL    Direct Measure HDL  Female:  Greater than or equal to 50 mg/dL   Male:  Greater than or equal to 40 mg/dL    LDL Cholesterol  Desirable:  <100mg/dL  Above Desirable:  100-129 mg/dL   Borderline High:  130-159 mg/dL   High:  160-189 mg/dL   Very High:  >= 190 mg/dL    Non HDL Cholesterol  Desirable:  130 mg/dL  Above Desirable:  130-159 mg/dL  Borderline High:  160-189 mg/dL  High:  190-219 mg/dL  Very High:  Greater than or equal to 220 mg/dL   Basic metabolic panel  (Ca, Cl, CO2, Creat, Gluc, K, Na, BUN)   Result Value Ref Range    Sodium 141 136 - 145 mmol/L    Potassium 3.4 3.4 - 5.3 mmol/L    Chloride 102 98 - 107 mmol/L    Carbon Dioxide (CO2) 28 22 - 29 mmol/L    Anion Gap 11 7 - 15 mmol/L    Urea Nitrogen 19.2 8.0 - 23.0 mg/dL    Creatinine 0.78 0.51 - 0.95 mg/dL    Calcium 9.5 8.8 - 10.2 mg/dL    Glucose 110 (H) 70 - 99 mg/dL    GFR Estimate 78 >60 mL/min/1.73m2      Comment:      Effective December 21, 2021 eGFRcr in adults is calculated using the 2021 CKD-EPI creatinine equation which includes age and gender (Noble vega al., NEJ, DOI: 10.1056/DOVItr8049503)   TSH   Result Value Ref Range    TSH 4.44 (H) 0.30 - 4.20 uIU/mL       If  you have any questions or concerns, please call the clinic at the number listed above.       Sincerely,      Nelson Churchill MD

## 2022-09-28 NOTE — PROGRESS NOTES
"SUBJECTIVE:   Daysi is a 76 year old who presents for Preventive Visit.      Patient has been advised of split billing requirements and indicates understanding: Yes  Are you in the first 12 months of your Medicare coverage?  No    Healthy Habits:     In general, how would you rate your overall health?  Excellent    Frequency of exercise:  4-5 days/week    Duration of exercise:  30-45 minutes    Do you usually eat at least 4 servings of fruit and vegetables a day, include whole grains    & fiber and avoid regularly eating high fat or \"junk\" foods?  No    Taking medications regularly:  Yes    Medication side effects:  None    Ability to successfully perform activities of daily living:  No assistance needed    Home Safety:  No safety concerns identified    Hearing Impairment:  Feel that people are mumbling or not speaking clearly, need to ask people to speak up or repeat themselves and difficulty understanding speech on the telephone    In the past 6 months, have you been bothered by leaking of urine?  No    In general, how would you rate your overall mental or emotional health?  Excellent      PHQ-2 Total Score: 0    Additional concerns today:  No    Hypertension, hyperlipidemia, and hypothyroidism well controlled.  She reports her blood pressure numbers at home at goal of 130/80.  She denies side effects from medications.  She is in need of refills today.  Laboratory evaluations are needed as well.  She has macular degeneration and has routine follow-up with ophthalmology.    Do you feel safe in your environment? Yes    Have you ever done Advance Care Planning? (For example, a Health Directive, POLST, or a discussion with a medical provider or your loved ones about your wishes): Yes, patient states has an Advance Care Planning document and will bring a copy to the clinic.       Fall risk  Fallen 2 or more times in the past year?: No  Any fall with injury in the past year?: No    Cognitive Screening   1) Repeat 3 " items Banana, Sunrise, Chair  2) Clock draw: ABNORMAL wrong hand placement  3) 3 item recall: Recalls 2 objects   Results: ABNORMAL clock, 1-2 items recalled: PROBABLE COGNITIVE IMPAIRMENT, **INFORM PROVIDER**    Mini-CogTM Copyright GILLIAN Castro. Licensed by the author for use in [facility]; reprinted with permission (huong@[facility]). All rights reserved.          Reviewed and updated as needed this visit by clinical staff   Tobacco  Allergies  Meds                Reviewed and updated as needed this visit by Provider                   Social History     Tobacco Use     Smoking status: Former Smoker     Packs/day: 0.25     Years: 5.00     Pack years: 1.25     Types: Cigarettes     Smokeless tobacco: Never Used   Substance Use Topics     Alcohol use: No     If you drink alcohol do you typically have >3 drinks per day or >7 drinks per week? No    Alcohol Use 9/28/2022   Prescreen: >3 drinks/day or >7 drinks/week? No   No flowsheet data found.        Current providers sharing in care for this patient include:   Patient Care Team:  Nelson Churchill MD as PCP - General (Family Medicine)  Frw, None as PCP - Obstetrics/Gynecology  Kian Velarde MD as PCP - Ophthalmology  Nelson Churchill MD as Assigned PCP    The following health maintenance items are reviewed in Epic and correct as of today:  Health Maintenance   Topic Date Due     ANNUAL REVIEW OF HM ORDERS  Never done     ADVANCE CARE PLANNING  Never done     COVID-19 Vaccine (1) Never done     HEPATITIS C SCREENING  Never done     LUNG CANCER SCREENING  Never done     COLORECTAL CANCER SCREENING  09/14/2015     ZOSTER IMMUNIZATION (2 of 3) 05/09/2016     INFLUENZA VACCINE (1) 09/01/2022     TSH W/FREE T4 REFLEX  09/20/2022     DEXA  09/24/2023     MEDICARE ANNUAL WELLNESS VISIT  09/28/2023     FALL RISK ASSESSMENT  09/28/2023     LIPID  09/20/2026     DTAP/TDAP/TD IMMUNIZATION (2 - Td or Tdap) 03/13/2029     PHQ-2 (once per calendar year)   "Completed     Pneumococcal Vaccine: 65+ Years  Completed     IPV IMMUNIZATION  Aged Out     MENINGITIS IMMUNIZATION  Aged Out     HEPATITIS B IMMUNIZATION  Aged Out     Lab work is in process  Labs reviewed in EPIC      Mammogram Screening - Patient over age 75, has elected to discontinue screenings.  Pertinent mammograms are reviewed under the imaging tab.    Review of Systems   Constitutional: Negative for chills and fever.   HENT: Positive for hearing loss. Negative for congestion, ear pain and sore throat.    Eyes: Positive for visual disturbance. Negative for pain.   Respiratory: Negative for cough and shortness of breath.    Cardiovascular: Negative for chest pain, palpitations and peripheral edema.   Gastrointestinal: Positive for heartburn. Negative for abdominal pain, constipation, diarrhea, hematochezia and nausea.   Breasts:  Negative for tenderness, breast mass and discharge.   Genitourinary: Negative for dysuria, frequency, genital sores, hematuria, pelvic pain, urgency, vaginal bleeding and vaginal discharge.   Musculoskeletal: Negative for arthralgias, joint swelling and myalgias.   Skin: Negative for rash.   Neurological: Negative for dizziness, weakness, headaches and paresthesias.   Psychiatric/Behavioral: Negative for mood changes. The patient is not nervous/anxious.          OBJECTIVE:   BP (!) 163/94 (BP Location: Right arm, Patient Position: Sitting, Cuff Size: Adult Regular)   Pulse 74   Temp 97.3  F (36.3  C) (Tympanic)   Ht 1.727 m (5' 8\")   Wt 75.7 kg (166 lb 12.8 oz)   BMI 25.36 kg/m   Estimated body mass index is 25.36 kg/m  as calculated from the following:    Height as of this encounter: 1.727 m (5' 8\").    Weight as of this encounter: 75.7 kg (166 lb 12.8 oz).  Physical Exam  GENERAL: healthy, alert and no distress  EYES: Eyes grossly normal to inspection, PERRL and conjunctivae and sclerae normal  HENT: ear canals and TM's normal, nose and mouth without ulcers or lesions  NECK: " no adenopathy, no asymmetry, masses, or scars and thyroid normal to palpation  RESP: lungs clear to auscultation - no rales, rhonchi or wheezes  BREAST: normal without masses, tenderness or nipple discharge and no palpable axillary masses or adenopathy  CV: regular rate and rhythm, normal S1 S2, no S3 or S4, no murmur, click or rub, no peripheral edema and peripheral pulses strong  ABDOMEN: soft, nontender, no hepatosplenomegaly, no masses and bowel sounds normal  MS: no gross musculoskeletal defects noted, no edema  SKIN: no suspicious lesions or rashes  NEURO: Normal strength and tone, mentation intact and speech normal  PSYCH: mentation appears normal, affect normal/bright    Diagnostic Test Results:  Labs reviewed in Epic    ASSESSMENT / PLAN:   (Z00.00) Encounter for Medicare annual wellness exam  (primary encounter diagnosis)  Comment: Doing well off  Plan: Discussed health maintenance, appropriate diet and exercise level    (E78.2) Mixed hyperlipidemia  Comment: Controlled, continue current medication  Plan: Lipid panel reflex to direct LDL Fasting,         atorvastatin (LIPITOR) 10 MG tablet            (I10) Primary hypertension  Comment: Controlled with normal home numbers, continue current medication  Plan: Basic metabolic panel  (Ca, Cl, CO2, Creat,         Gluc, K, Na, BUN), hydrochlorothiazide         (HYDRODIURIL) 25 MG tablet, losartan (COZAAR)         100 MG tablet            (E03.4) Hypothyroidism due to acquired atrophy of thyroid  Comment: Euthyroid, continue with current medication  Plan: TSH    (H35.3130) Bilateral nonexudative age-related macular degeneration, unspecified stage  Comment: She follows with ophthalmology      Patient has been advised of split billing requirements and indicates understanding: Yes    COUNSELING:  Reviewed preventive health counseling, as reflected in patient instructions       Regular exercise       Healthy diet/nutrition       Vision screening       Hearing  "screening       Dental care       Bladder control       Fall risk prevention       Colon cancer screening    Estimated body mass index is 25.36 kg/m  as calculated from the following:    Height as of this encounter: 1.727 m (5' 8\").    Weight as of this encounter: 75.7 kg (166 lb 12.8 oz).        She reports that she has quit smoking. Her smoking use included cigarettes. She has a 1.25 pack-year smoking history. She has never used smokeless tobacco.      Appropriate preventive services were discussed with this patient, including applicable screening as appropriate for cardiovascular disease, diabetes, osteopenia/osteoporosis, and glaucoma.  As appropriate for age/gender, discussed screening for colorectal cancer, prostate cancer, breast cancer, and cervical cancer. Checklist reviewing preventive services available has been given to the patient.    Reviewed patients plan of care and provided an AVS. The Basic Care Plan (routine screening as documented in Health Maintenance) for Daysi meets the Care Plan requirement. This Care Plan has been established and reviewed with the Patient.    Counseling Resources:  ATP IV Guidelines  Pooled Cohorts Equation Calculator  Breast Cancer Risk Calculator  Breast Cancer: Medication to Reduce Risk  FRAX Risk Assessment  ICSI Preventive Guidelines  Dietary Guidelines for Americans, 2010  USDA's MyPlate  ASA Prophylaxis  Lung CA Screening    Nelson Churchill MD  [facility]    Identified Health Risks:    The patient was counseled and encouraged to consider modifying their diet and eating habits. She was provided with information on recommended healthy diet options.  The patient was provided with written information regarding signs of hearing loss.  "

## 2022-09-28 NOTE — PATIENT INSTRUCTIONS
Patient Education   Personalized Prevention Plan  You are due for the preventive services outlined below.  Your care team is available to assist you in scheduling these services.  If you have already completed any of these items, please share that information with your care team to update in your medical record.  Health Maintenance Due   Topic Date Due     ANNUAL REVIEW OF HM ORDERS  Never done     COVID-19 Vaccine (1) Never done     Hepatitis C Screening  Never done     LUNG CANCER SCREENING  Never done     Colorectal Cancer Screening  09/14/2015     Zoster (Shingles) Vaccine (2 of 3) 05/09/2016     Flu Vaccine (1) 09/01/2022     Thyroid Function Lab  09/20/2022       Understanding USDA MyPlate  The USDA has guidelines to help you make healthy food choices. These are called MyPlate. MyPlate shows the food groups that make up healthy meals using the image of a place setting. Before you eat, think about the healthiest choices for what to put on your plate or in your cup or bowl. To learn more about building a healthy plate, visit www.choosemyplate.gov.    The food groups    Fruits. Any fruit or 100% fruit juice counts as part of the Fruit Group. Fruits may be fresh, canned, frozen, or dried, and may be whole, cut-up, or pureed. Make 1/2 of your plate fruits and vegetables.    Vegetables. Any vegetable or 100% vegetable juice counts as a member of the Vegetable Group. Vegetables may be fresh, frozen, canned, or dried. They can be served raw or cooked and may be whole, cut-up, or mashed. Make 1/2 of your plate fruits and vegetables.    Grains. All foods made from grains are part of the Grains Group. These include wheat, rice, oats, cornmeal, and barley. Grains are often used to make foods such as bread, pasta, oatmeal, cereal, tortillas, and grits. Grains should be no more than 1/4 of your plate. At least half of your grains should be whole grains.    Protein. This group includes meat, poultry, seafood, beans and  peas, eggs, processed soy products (such as tofu), nuts (including nut butters), and seeds. Make protein choices no more than 1/4 of your plate. Meat and poultry choices should be lean or low fat.    Dairy. The Dairy Group includes all fluid milk products and foods made from milk that contain calcium, such as yogurt and cheese. (Foods that have little calcium, such as cream, butter, and cream cheese, are not part of this group.) Most dairy choices should be low-fat or fat-free.    Oils. Oils aren't a food group, but they do contain essential nutrients. However it's important to watch your intake of oils. These are fats that are liquid at room temperature. They include canola, corn, olive, soybean, vegetable, and sunflower oil. Foods that are mainly oil include mayonnaise, certain salad dressings, and soft margarines. You likely already get your daily oil allowance from the foods you eat.  Things to limit  Eating healthy also means limiting these things in your diet:       Salt (sodium). Many processed foods have a lot of sodium. To keep sodium intake down, eat fresh vegetables, meats, poultry, and seafood when possible. Purchase low-sodium, reduced-sodium, or no-salt-added food products at the store. And don't add salt to your meals at home. Instead, season them with herbs and spices such as dill, oregano, cumin, and paprika. Or try adding flavor with lemon or lime zest and juice.    Saturated fat. Saturated fats are most often found in animal products such as beef, pork, and chicken. They are often solid at room temperature, such as butter. To reduce your saturated fat intake, choose leaner cuts of meat and poultry. And try healthier cooking methods such as grilling, broiling, roasting, or baking. For a simple lower-fat swap, use plain nonfat yogurt instead of mayonnaise when making potato salad or macaroni salad.    Added sugars. These are sugars added to foods. They are in foods such as ice cream, candy, soda,  fruit drinks, sports drinks, energy drinks, cookies, pastries, jams, and syrups. Cut down on added sugars by sharing sweet treats with a family member or friend. You can also choose fruit for dessert, and drink water or other unsweetened beverages.     [employer] last reviewed this educational content on 6/1/2020 2000-2021 [employer]. All rights reserved. This information is not intended as a substitute for professional medical care. Always follow your healthcare professional's instructions.          Signs of Hearing Loss      Hearing much better with one ear can be a sign of hearing loss.   Hearing loss is a problem shared by many people. In fact, it is one of the most common health problems, particularly as people age. Most people age 65 and older have some hearing loss. By age 80, almost everyone does. Hearing loss often occurs slowly over the years. So you may not realize your hearing has gotten worse.  Have your hearing checked  Call your healthcare provider if you:    Have to strain to hear normal conversation    Have to watch other people s faces very carefully to follow what they re saying    Need to ask people to repeat what they ve said    Often misunderstand what people are saying    Turn the volume of the television or radio up so high that others complain    Feel that people are mumbling when they re talking to you    Find that the effort to hear leaves you feeling tired and irritated    Notice, when using the phone, that you hear better with one ear than the other  [employer] last reviewed this educational content on 1/1/2020 2000-2021 [employer]. All rights reserved. This information is not intended as a substitute for professional medical care. Always follow your healthcare professional's instructions.

## 2023-01-09 ENCOUNTER — OFFICE VISIT (OUTPATIENT)
Dept: FAMILY MEDICINE | Facility: CLINIC | Age: 78
End: 2023-01-09
Payer: COMMERCIAL

## 2023-01-09 VITALS
HEIGHT: 68 IN | HEART RATE: 69 BPM | OXYGEN SATURATION: 97 % | SYSTOLIC BLOOD PRESSURE: 153 MMHG | TEMPERATURE: 98 F | WEIGHT: 172.5 LBS | BODY MASS INDEX: 26.14 KG/M2 | DIASTOLIC BLOOD PRESSURE: 89 MMHG

## 2023-01-09 DIAGNOSIS — H61.23 EXCESSIVE CERUMEN IN BOTH EAR CANALS: Primary | ICD-10-CM

## 2023-01-09 PROCEDURE — 69209 REMOVE IMPACTED EAR WAX UNI: CPT | Mod: 50 | Performed by: FAMILY MEDICINE

## 2023-01-09 NOTE — PROGRESS NOTES
"  Assessment & Plan     Excessive cerumen in both ear canals  There is minimal cerumen in each ear.  This was easily cleaned out with lavage.  TM and canal she will continue using over-the-counter wax removal drops and continue to follow-up with her hearing aid supplier.                   No follow-ups on file.    Nelson Churchill MD  [facility]    Naheed Tavarez is a 77 year old accompanied by her self, presenting for the following health issues:  Ear Problem (Check ears, would like ear lavage)      History of Present Illness       Reason for visit:  Fluh out    She eats 2-3 servings of fruits and vegetables daily.She consumes 1 sweetened beverage(s) daily.She exercises with enough effort to increase her heart rate 10 to 19 minutes per day.  She exercises with enough effort to increase her heart rate 4 days per week.   She is taking medications regularly.       Patient is here to have her ears looked at.  She had a hearing assessment done 01/07/2023 and was told that it couldn't be completed due to cerumen in both ears.   She would like to have bilateral ear lavage so she can finish with her hearing assessment.        Review of Systems         Objective    BP (!) 153/89 (BP Location: Right arm, Patient Position: Sitting, Cuff Size: Adult Large)   Pulse 69   Temp 98  F (36.7  C) (Tympanic)   Ht 1.727 m (5' 8\")   Wt 78.2 kg (172 lb 8 oz)   SpO2 97%   BMI 26.23 kg/m    Body mass index is 26.23 kg/m .  Physical Exam   Alert, in No acute distress  Ear canals are patent, small amount of cerumen bilaterally, TM appears normal                    "

## 2023-06-24 DIAGNOSIS — E78.2 MIXED HYPERLIPIDEMIA: ICD-10-CM

## 2023-06-24 DIAGNOSIS — I10 PRIMARY HYPERTENSION: ICD-10-CM

## 2023-06-25 RX ORDER — LEVOTHYROXINE SODIUM 100 UG/1
TABLET ORAL
Qty: 90 TABLET | Refills: 3 | OUTPATIENT
Start: 2023-06-25

## 2023-06-25 RX ORDER — ATORVASTATIN CALCIUM 10 MG/1
TABLET, FILM COATED ORAL
Qty: 90 TABLET | Refills: 3 | OUTPATIENT
Start: 2023-06-25

## 2023-06-25 RX ORDER — LOSARTAN POTASSIUM 100 MG/1
TABLET ORAL
Qty: 90 TABLET | Refills: 3 | OUTPATIENT
Start: 2023-06-25

## 2023-06-25 RX ORDER — HYDROCHLOROTHIAZIDE 25 MG/1
TABLET ORAL
Qty: 90 TABLET | Refills: 3 | OUTPATIENT
Start: 2023-06-25

## 2023-06-25 NOTE — TELEPHONE ENCOUNTER
"Last Written Prescription Date:  9/28/22  Last Fill Quantity: 90,  # refills: 3   Last office visit provider:  1/9/23    Requested Prescriptions   Pending Prescriptions Disp Refills     atorvastatin (LIPITOR) 10 MG tablet [Pharmacy Med Name: ATORVASTATIN 10MG TABLETS] 90 tablet 3     Sig: TAKE 1 TABLET(10 MG) BY MOUTH DAILY       Statins Protocol Passed - 6/24/2023  8:08 AM        Passed - LDL on file in past 12 months     Recent Labs   Lab Test 09/28/22  1016   *             Passed - No abnormal creatine kinase in past 12 months     No lab results found.             Passed - Recent (12 mo) or future (30 days) visit within the authorizing provider's specialty     Patient has had an office visit with the authorizing provider or a provider within the authorizing providers department within the previous 12 mos or has a future within next 30 days. See \"Patient Info\" tab in inbasket, or \"Choose Columns\" in Meds & Orders section of the refill encounter.              Passed - Medication is active on med list        Passed - Patient is age 18 or older        Passed - No active pregnancy on record        Passed - No positive pregnancy test in past 12 months        Last Written Prescription Date:  9/28/22  Last Fill Quantity: 90,  # refills: 3   Last office visit provider:  1/9/23       levothyroxine (SYNTHROID/LEVOTHROID) 100 MCG tablet [Pharmacy Med Name: LEVOTHYROXINE 0.100MG (100MCG) TAB] 90 tablet 3     Sig: TAKE 1 TABLET(100 MCG) BY MOUTH DAILY       Thyroid Protocol Failed - 6/24/2023  8:08 AM        Failed - Normal TSH on file in past 12 months     Recent Labs   Lab Test 09/28/22  1016   TSH 4.44*              Passed - Patient is 12 years or older        Passed - Recent (12 mo) or future (30 days) visit within the authorizing provider's specialty     Patient has had an office visit with the authorizing provider or a provider within the authorizing providers department within the previous 12 mos or has a future " "within next 30 days. See \"Patient Info\" tab in inbasket, or \"Choose Columns\" in Meds & Orders section of the refill encounter.              Passed - Medication is active on med list        Passed - No active pregnancy on record     If patient is pregnant or has had a positive pregnancy test, please check TSH.          Passed - No positive pregnancy test in past 12 months     If patient is pregnant or has had a positive pregnancy test, please check TSH.        Last Written Prescription Date:  9/28/22  Last Fill Quantity: 90,  # refills: 3   Last office visit provider:  1/9/23       losartan (COZAAR) 100 MG tablet [Pharmacy Med Name: LOSARTAN 100MG TABLETS] 90 tablet 3     Sig: TAKE 1 TABLET(100 MG) BY MOUTH DAILY       Angiotensin-II Receptors Failed - 6/24/2023  8:08 AM        Failed - Last blood pressure under 140/90 in past 12 months     BP Readings from Last 3 Encounters:   01/09/23 (!) 153/89   09/28/22 (!) 148/88   09/20/21 (!) 166/94                 Passed - Recent (12 mo) or future (30 days) visit within the authorizing provider's specialty     Patient has had an office visit with the authorizing provider or a provider within the authorizing providers department within the previous 12 mos or has a future within next 30 days. See \"Patient Info\" tab in inbasket, or \"Choose Columns\" in Meds & Orders section of the refill encounter.              Passed - Medication is active on med list        Passed - Patient is age 18 or older        Passed - No active pregnancy on record        Passed - Normal serum creatinine on file in past 12 months     Recent Labs   Lab Test 09/28/22  1016   CR 0.78       Ok to refill medication if creatinine is low          Passed - Normal serum potassium on file in past 12 months     Recent Labs   Lab Test 09/28/22  1016   POTASSIUM 3.4                    Passed - No positive pregnancy test in past 12 months        Last Written Prescription Date:  9/28/22  Last Fill Quantity: 90,  # " "refills: 3   Last office visit provider:  1/9/23       hydrochlorothiazide (HYDRODIURIL) 25 MG tablet [Pharmacy Med Name: HYDROCHLOROTHIAZIDE 25MG TABLETS] 90 tablet 3     Sig: TAKE 1 TABLET(25 MG) BY MOUTH DAILY       Diuretics (Including Combos) Protocol Failed - 6/24/2023  8:08 AM        Failed - Blood pressure under 140/90 in past 12 months     BP Readings from Last 3 Encounters:   01/09/23 (!) 153/89   09/28/22 (!) 148/88   09/20/21 (!) 166/94                 Passed - Recent (12 mo) or future (30 days) visit within the authorizing provider's specialty     Patient has had an office visit with the authorizing provider or a provider within the authorizing providers department within the previous 12 mos or has a future within next 30 days. See \"Patient Info\" tab in inbasket, or \"Choose Columns\" in Meds & Orders section of the refill encounter.              Passed - Medication is active on med list        Passed - Patient is age 18 or older        Passed - No active pregancy on record        Passed - Normal serum creatinine on file in past 12 months     Recent Labs   Lab Test 09/28/22  1016   CR 0.78              Passed - Normal serum potassium on file in past 12 months     Recent Labs   Lab Test 09/28/22  1016   POTASSIUM 3.4                    Passed - Normal serum sodium on file in past 12 months     Recent Labs   Lab Test 09/28/22  1016                 Passed - No positive pregnancy test in past 12 months             Lani Garcia RN 06/25/23 3:55 PM  "

## 2023-10-02 ENCOUNTER — OFFICE VISIT (OUTPATIENT)
Dept: FAMILY MEDICINE | Facility: CLINIC | Age: 78
End: 2023-10-02
Payer: COMMERCIAL

## 2023-10-02 VITALS
OXYGEN SATURATION: 98 % | WEIGHT: 166.9 LBS | BODY MASS INDEX: 25.29 KG/M2 | DIASTOLIC BLOOD PRESSURE: 92 MMHG | SYSTOLIC BLOOD PRESSURE: 166 MMHG | HEART RATE: 73 BPM | TEMPERATURE: 97.7 F | RESPIRATION RATE: 24 BRPM | HEIGHT: 68 IN

## 2023-10-02 DIAGNOSIS — M81.0 AGE-RELATED OSTEOPOROSIS WITHOUT CURRENT PATHOLOGICAL FRACTURE: ICD-10-CM

## 2023-10-02 DIAGNOSIS — Z00.00 ENCOUNTER FOR MEDICARE ANNUAL WELLNESS EXAM: Primary | ICD-10-CM

## 2023-10-02 DIAGNOSIS — I10 PRIMARY HYPERTENSION: ICD-10-CM

## 2023-10-02 DIAGNOSIS — E78.2 MIXED HYPERLIPIDEMIA: ICD-10-CM

## 2023-10-02 DIAGNOSIS — E03.4 ACQUIRED ATROPHY OF THYROID: ICD-10-CM

## 2023-10-02 LAB
ANION GAP SERPL CALCULATED.3IONS-SCNC: 15 MMOL/L (ref 7–15)
BUN SERPL-MCNC: 18 MG/DL (ref 8–23)
CALCIUM SERPL-MCNC: 9.4 MG/DL (ref 8.8–10.2)
CHLORIDE SERPL-SCNC: 104 MMOL/L (ref 98–107)
CHOLEST SERPL-MCNC: 189 MG/DL
CREAT SERPL-MCNC: 0.77 MG/DL (ref 0.51–0.95)
CREAT UR-MCNC: 35.4 MG/DL
DEPRECATED HCO3 PLAS-SCNC: 24 MMOL/L (ref 22–29)
EGFRCR SERPLBLD CKD-EPI 2021: 79 ML/MIN/1.73M2
GLUCOSE SERPL-MCNC: 121 MG/DL (ref 70–99)
HDLC SERPL-MCNC: 55 MG/DL
LDLC SERPL CALC-MCNC: 111 MG/DL
MICROALBUMIN UR-MCNC: <12 MG/L
MICROALBUMIN/CREAT UR: NORMAL MG/G{CREAT}
NONHDLC SERPL-MCNC: 134 MG/DL
POTASSIUM SERPL-SCNC: 3.5 MMOL/L (ref 3.4–5.3)
SODIUM SERPL-SCNC: 143 MMOL/L (ref 135–145)
TRIGL SERPL-MCNC: 113 MG/DL
TSH SERPL DL<=0.005 MIU/L-ACNC: 1.43 UIU/ML (ref 0.3–4.2)

## 2023-10-02 PROCEDURE — 82570 ASSAY OF URINE CREATININE: CPT | Performed by: FAMILY MEDICINE

## 2023-10-02 PROCEDURE — 80061 LIPID PANEL: CPT | Performed by: FAMILY MEDICINE

## 2023-10-02 PROCEDURE — 80048 BASIC METABOLIC PNL TOTAL CA: CPT | Performed by: FAMILY MEDICINE

## 2023-10-02 PROCEDURE — 99214 OFFICE O/P EST MOD 30 MIN: CPT | Mod: 25 | Performed by: FAMILY MEDICINE

## 2023-10-02 PROCEDURE — 84443 ASSAY THYROID STIM HORMONE: CPT | Performed by: FAMILY MEDICINE

## 2023-10-02 PROCEDURE — 82043 UR ALBUMIN QUANTITATIVE: CPT | Performed by: FAMILY MEDICINE

## 2023-10-02 PROCEDURE — 36415 COLL VENOUS BLD VENIPUNCTURE: CPT | Performed by: FAMILY MEDICINE

## 2023-10-02 PROCEDURE — G0439 PPPS, SUBSEQ VISIT: HCPCS | Performed by: FAMILY MEDICINE

## 2023-10-02 RX ORDER — ATORVASTATIN CALCIUM 10 MG/1
10 TABLET, FILM COATED ORAL DAILY
Qty: 90 TABLET | Refills: 3 | Status: SHIPPED | OUTPATIENT
Start: 2023-10-02 | End: 2024-09-25

## 2023-10-02 RX ORDER — LOSARTAN POTASSIUM 50 MG/1
50 TABLET ORAL DAILY
Qty: 90 TABLET | Refills: 3 | Status: SHIPPED | OUTPATIENT
Start: 2023-10-02 | End: 2024-09-25

## 2023-10-02 RX ORDER — HYDROCHLOROTHIAZIDE 25 MG/1
25 TABLET ORAL DAILY
Qty: 90 TABLET | Refills: 3 | Status: SHIPPED | OUTPATIENT
Start: 2023-10-02 | End: 2024-09-25

## 2023-10-02 RX ORDER — LEVOTHYROXINE SODIUM 100 UG/1
100 TABLET ORAL DAILY
Qty: 90 TABLET | Refills: 3 | Status: SHIPPED | OUTPATIENT
Start: 2023-10-02 | End: 2024-09-25

## 2023-10-02 ASSESSMENT — ENCOUNTER SYMPTOMS
ARTHRALGIAS: 0
NAUSEA: 0
FEVER: 0
DIZZINESS: 0
HEARTBURN: 0
BREAST MASS: 0
MYALGIAS: 0
ABDOMINAL PAIN: 0
FREQUENCY: 0
COUGH: 0
NERVOUS/ANXIOUS: 0
HEMATURIA: 0
PARESTHESIAS: 0
DIARRHEA: 0
WEAKNESS: 0
SHORTNESS OF BREATH: 0
EYE PAIN: 0
PALPITATIONS: 0
CONSTIPATION: 0
HEADACHES: 0
HEMATOCHEZIA: 0
JOINT SWELLING: 0
DYSURIA: 0
SORE THROAT: 0
CHILLS: 0

## 2023-10-02 ASSESSMENT — ACTIVITIES OF DAILY LIVING (ADL): CURRENT_FUNCTION: NO ASSISTANCE NEEDED

## 2023-10-02 NOTE — PATIENT INSTRUCTIONS
Patient Education   Personalized Prevention Plan  You are due for the preventive services outlined below.  Your care team is available to assist you in scheduling these services.  If you have already completed any of these items, please share that information with your care team to update in your medical record.  Health Maintenance Due   Topic Date Due     Kidney Microalbumin Urine Test  Never done     Flu Vaccine (1) 09/01/2023     Osteoporosis Screening  09/24/2023     Basic Metabolic Panel  09/28/2023     Cholesterol Lab  09/28/2023     ANNUAL REVIEW OF HM ORDERS  09/28/2023     Thyroid Function Lab  09/28/2023     Learning About Dietary Guidelines  What are the Dietary Guidelines for Americans?     Dietary Guidelines for Americans provide tips for eating well and staying healthy. This helps reduce the risk for long-term (chronic) diseases.  These guidelines recommend that you:  Eat and drink the right amount for you. The U.[place] government's food guide is called MyPlate. It can help you make your own well-balanced eating plan.  Try to balance your eating with your activity. This helps you stay at a healthy weight.  Drink alcohol in moderation, if at all.  Limit foods high in salt, saturated fat, trans fat, and added sugar.  These guidelines are from the U.S. Department of Agriculture and the U.S. Department of Health and Human Services. They are updated every 5 years.  What is MyPlate?  MyPlate is the U.[place] government's food guide. It can help you make your own well-balanced eating plan. A balanced eating plan means that you eat enough, but not too much, and that your food gives you the nutrients you need to stay healthy.  MyPlate focuses on eating plenty of whole grains, fruits, and vegetables, and on limiting fat and sugar. It is available online at www.ChooseMyPlate.gov.  How can you get started?  If you're trying to eat healthier, you can slowly change your eating habits over time. You don't have to make big  "changes all at once. Start by adding one or two healthy foods to your meals each day.  Grains  Choose whole-grain breads and cereals and whole-wheat pasta and whole-grain crackers.  Vegetables  Eat a variety of vegetables every day. They have lots of nutrients and are part of a heart-healthy diet.  Fruits  Eat a variety of fruits every day. Fruits contain lots of nutrients. Choose fresh fruit instead of fruit juice.  Protein foods  Choose fish and lean poultry more often. Eat red meat and fried meats less often. Dried beans, tofu, and nuts are also good sources of protein.  Dairy  Choose low-fat or fat-free products from this food group. If you have problems digesting milk, try eating cheese or yogurt instead.  Fats and oils  Limit fats and oils if you're trying to cut calories. Choose healthy fats when you cook. These include canola oil and olive oil.  Where can you learn more?  Go to https://www.[employer].net/patiented  Enter D676 in the search box to learn more about \"Learning About Dietary Guidelines.\"  Current as of: March 1, 2023               Content Version: 13.7    8916-7280 [email or web address].   Care instructions adapted under license by your healthcare professional. If you have questions about a medical condition or this instruction, always ask your healthcare professional. [email or web address] disclaims any warranty or liability for your use of this information.      Hearing Loss: Care Instructions  Overview     Hearing loss is a sudden or slow decrease in how well you hear. It can range from slight to profound. Permanent hearing loss can occur with aging. It also can happen when you are exposed long-term to loud noise. Examples include listening to loud music, riding motorcycles, or being around other loud machines.  Hearing loss can affect your work and home life. It can make you feel lonely or depressed. You may feel that you have lost your independence. But hearing aids and other devices " can help you hear better and feel connected to others.  Follow-up care is a key part of your treatment and safety. Be sure to make and go to all appointments, and call your doctor if you are having problems. It's also a good idea to know your test results and keep a list of the medicines you take.  How can you care for yourself at home?  Avoid loud noises whenever possible. This helps keep your hearing from getting worse.  Always wear hearing protection around loud noises.  Wear a hearing aid as directed.  A professional can help you pick a hearing aid that will work best for you.  You can also get hearing aids over the counter for mild to moderate hearing loss.  Have hearing tests as your doctor suggests. They can show whether your hearing has changed. Your hearing aid may need to be adjusted.  Use other devices as needed. These may include:  Telephone amplifiers and hearing aids that can connect to a television, stereo, radio, or microphone.  Devices that use lights or vibrations. These alert you to the doorbell, a ringing telephone, or a baby monitor.  Television closed-captioning. This shows the words at the bottom of the screen. Most new TVs can do this.  TTY (text telephone). This lets you type messages back and forth on the telephone instead of talking or listening. These devices are also called TDD. When messages are typed on the keyboard, they are sent over the phone line to a receiving TTY. The message is shown on a monitor.  Use text messaging, social media, and email if it is hard for you to communicate by telephone.  Try to learn a listening technique called speechreading. It is not lipreading. You pay attention to people's gestures, expressions, posture, and tone of voice. These clues can help you understand what a person is saying. Face the person you are talking to, and have them face you. Make sure the lighting is good. You need to see the other person's face clearly.  Think about counseling if you  "need help to adjust to your hearing loss.  When should you call for help?  Watch closely for changes in your health, and be sure to contact your doctor if:    You think your hearing is getting worse.     You have new symptoms, such as dizziness or nausea.   Where can you learn more?  Go to https://www.[employer].net/patiented  Enter R798 in the search box to learn more about \"Hearing Loss: Care Instructions.\"  Current as of: March 1, 2023               Content Version: 13.7    1188-2143 [employer].   Care instructions adapted under license by your healthcare professional. If you have questions about a medical condition or this instruction, always ask your healthcare professional. [employer] disclaims any warranty or liability for your use of this information.         "

## 2023-10-02 NOTE — PROGRESS NOTES
"The patient was counseled and encouraged to consider modifying their diet and eating habits. She was provided with information on recommended healthy diet options.  The patient was provided with written information regarding signs of hearing loss.Answers submitted by the patient for this visit:  Annual Preventive Visit (Submitted on 10/2/2023)  Chief Complaint: Annual Exam:  In general, how would you rate your overall physical health?: good  Frequency of exercise:: 2-3 days/week  Do you usually eat at least 4 servings of fruit and vegetables a day, include whole grains & fiber, and avoid regularly eating high fat or \"junk\" foods? : No  Taking medications regularly:: No  Medication side effects:: None  Activities of Daily Living: no assistance needed  Home safety: no safety concerns identified  Hearing Impairment:: need to ask people to speak up or repeat themselves  In the past 6 months, have you been bothered by leaking of urine?: No  abdominal pain: No  Blood in stool: No  Blood in urine: No  chest pain: No  chills: No  congestion: No  constipation: No  cough: No  diarrhea: No  dizziness: No  ear pain: No  eye pain: No  nervous/anxious: No  fever: No  frequency: No  genital sores: No  headaches: No  hearing loss: No  heartburn: No  arthralgias: No  joint swelling: No  peripheral edema: No  mood changes: No  myalgias: No  nausea: No  dysuria: No  palpitations: No  Skin sensation changes: No  sore throat: No  urgency: No  rash: No  shortness of breath: No  visual disturbance: No  weakness: No  pelvic pain: No  vaginal bleeding: No  vaginal discharge: No  tenderness: No  breast mass: No  breast discharge: No  In general, how would you rate your overall mental or emotional health?: good  Additional concerns today:: No  Exercise outside of work (Submitted on 10/2/2023)  Chief Complaint: Annual Exam:  Duration of exercise:: 30-45 minutes  Barriers to taking medications (Submitted on 10/2/2023)  Chief Complaint: Annual " Exam:  Barriers to taking medications:: None

## 2023-10-02 NOTE — PROGRESS NOTES
"SUBJECTIVE:   Daysi is a 77 year old who presents for Preventive Visit.      10/2/2023     7:54 AM   Additional Questions   Roomed by Selma LARSON CMA   Accompanied by Self       Are you in the first 12 months of your Medicare coverage?  No    Healthy Habits:     In general, how would you rate your overall health?  Good    Frequency of exercise:  2-3 days/week    Duration of exercise:  30-45 minutes    Do you usually eat at least 4 servings of fruit and vegetables a day, include whole grains    & fiber and avoid regularly eating high fat or \"junk\" foods?  No    Taking medications regularly:  No    Barriers to taking medications:  None    Medication side effects:  None    Ability to successfully perform activities of daily living:  No assistance needed    Home Safety:  No safety concerns identified    Hearing Impairment:  Need to ask people to speak up or repeat themselves    In the past 6 months, have you been bothered by leaking of urine?  No    In general, how would you rate your overall mental or emotional health?  Good    Additional concerns today:  No    Patient is here for chronic disease follow up      HTN      Current medications:  losartan  Medication side effects:  ? BP too low   Medication concerns:  dose   Talking medications as prescribed:  yes  Blood pressure controlled:  yes at home  Home monitor numbers:  usually less than 120 systolic   End organ symptoms:  no    HLD     Medication:  atorvastatin  LDL:  at goal  Myalgia:  no    She has macular degeneration with decreased near vision.  This has made it a challenge to drive.  Hypothyroidism is stable.    Today's PHQ-2 Score:       10/2/2023     7:49 AM   PHQ-2 ( 1999 [employer])   Q1: Little interest or pleasure in doing things 0   Q2: Feeling down, depressed or hopeless 0   PHQ-2 Score 0   Q1: Little interest or pleasure in doing things Not at all   Q2: Feeling down, depressed or hopeless Not at all   PHQ-2 Score 0       Have you ever done Advance Care " Planning? (For example, a Health Directive, POLST, or a discussion with a medical provider or your loved ones about your wishes): Yes, advance care planning is on file.    Patient wears bilateral hearing aids   Fall risk  Fallen 2 or more times in the past year?: No  Any fall with injury in the past year?: No    Cognitive Screening   1) Repeat 3 items (Leader, Season, Table)    2) Clock draw: ABNORMAL wrong hand placement  3) 3 item recall: Recalls 2 objects   Results: ABNORMAL clock, 1-2 items recalled: PROBABLE COGNITIVE IMPAIRMENT, **INFORM PROVIDER**    Mini-CogTM Copyright S Gloria. Licensed by the author for use in [facility]; reprinted with permission (huong@[email or web address]). All rights reserved.          Reviewed and updated as needed this visit by clinical staff   Tobacco  Allergies  Meds              Reviewed and updated as needed this visit by Provider                 Social History     Tobacco Use    Smoking status: Former     Packs/day: 0.25     Years: 5.00     Pack years: 1.25     Types: Cigarettes    Smokeless tobacco: Never   Substance Use Topics    Alcohol use: No             10/2/2023     7:48 AM   Alcohol Use   Prescreen: >3 drinks/day or >7 drinks/week? No          No data to display              Do you have a current opioid prescription? No  Do you use any other controlled substances or medications that are not prescribed by a provider? None        Current providers sharing in care for this patient include:   Patient Care Team:  Nelson Churchill MD as PCP - General (Family Medicine)  Frw, None as PCP - Obstetrics/Gynecology  Kian Velarde MD as PCP - Ophthalmology  Nelson Churchill MD as Assigned PCP    The following health maintenance items are reviewed in Epic and correct as of today:  Health Maintenance   Topic Date Due    MICROALBUMIN  Never done    INFLUENZA VACCINE (1) 09/01/2023    DEXA  09/24/2023    BMP  09/28/2023    LIPID  09/28/2023    ANNUAL REVIEW OF HM ORDERS   09/28/2023    MEDICARE ANNUAL WELLNESS VISIT  09/28/2023    TSH W/FREE T4 REFLEX  09/28/2023    FALL RISK ASSESSMENT  10/02/2024    ADVANCE CARE PLANNING  09/28/2027    DTAP/TDAP/TD IMMUNIZATION (2 - Td or Tdap) 03/13/2029    PHQ-2 (once per calendar year)  Completed    Pneumococcal Vaccine: 65+ Years  Completed    URINALYSIS  Completed    IPV IMMUNIZATION  Aged Out    HPV IMMUNIZATION  Aged Out    MENINGITIS IMMUNIZATION  Aged Out    HEPATITIS C SCREENING  Discontinued    MAMMO SCREENING  Discontinued    COLORECTAL CANCER SCREENING  Discontinued    ZOSTER IMMUNIZATION  Discontinued    LUNG CANCER SCREENING  Discontinued    COVID-19 Vaccine  Discontinued     Labs reviewed in EPIC      Mammogram Screening - Patient over age 75, has elected to discontinue screenings.  Pertinent mammograms are reviewed under the imaging tab.    Review of Systems   Constitutional:  Negative for chills and fever.   HENT:  Negative for congestion, ear pain, hearing loss and sore throat.    Eyes:  Negative for pain and visual disturbance.   Respiratory:  Negative for cough and shortness of breath.    Cardiovascular:  Negative for chest pain, palpitations and peripheral edema.   Gastrointestinal:  Negative for abdominal pain, constipation, diarrhea, heartburn, hematochezia and nausea.   Breasts:  Negative for tenderness, breast mass and discharge.   Genitourinary:  Negative for dysuria, frequency, genital sores, hematuria, pelvic pain, urgency, vaginal bleeding and vaginal discharge.   Musculoskeletal:  Negative for arthralgias, joint swelling and myalgias.   Skin:  Negative for rash.   Neurological:  Negative for dizziness, weakness, headaches and paresthesias.   Psychiatric/Behavioral:  Negative for mood changes. The patient is not nervous/anxious.          OBJECTIVE:   BP (!) 166/92 (BP Location: Right arm, Patient Position: Sitting, Cuff Size: Adult Regular)   Pulse 73   Temp 97.7  F (36.5  C) (Tympanic)   Resp 24   Ht 1.727 m (5'  "8\")   Wt 75.7 kg (166 lb 14.4 oz)   SpO2 98%   BMI 25.38 kg/m   Estimated body mass index is 25.38 kg/m  as calculated from the following:    Height as of this encounter: 1.727 m (5' 8\").    Weight as of this encounter: 75.7 kg (166 lb 14.4 oz).  Physical Exam  GENERAL: healthy, alert and no distress  EYES: Eyes grossly normal to inspection, PERRL and conjunctivae and sclerae normal  HENT: ear canals and TM's normal, nose and mouth without ulcers or lesions  NECK: no adenopathy, no asymmetry, masses, or scars and thyroid normal to palpation  RESP: lungs clear to auscultation - no rales, rhonchi or wheezes  CV: regular rate and rhythm, normal S1 S2, no S3 or S4, no murmur, click or rub, no peripheral edema and peripheral pulses strong  ABDOMEN: soft, nontender, no hepatosplenomegaly, no masses and bowel sounds normal  MS: no gross musculoskeletal defects noted, no edema  SKIN: no suspicious lesions or rashes  BACK: no CVA tenderness, no paralumbar tenderness  PSYCH: mentation appears normal, affect normal/bright    Diagnostic Test Results:  Labs reviewed in Epic    ASSESSMENT / PLAN:   Daysi was seen today for wellness visit.    Diagnoses and all orders for this visit:    Encounter for Medicare annual wellness exam    Mixed hyperlipidemia  Well controlled, continue with current medications  -     Lipid panel reflex to direct LDL Non-fasting; Future  -     atorvastatin (LIPITOR) 10 MG tablet; Take 1 tablet (10 mg) by mouth daily    Primary hypertension  Well controlled, continue with current medications  -     BASIC METABOLIC PANEL; Future  -     hydrochlorothiazide (HYDRODIURIL) 25 MG tablet; Take 1 tablet (25 mg) by mouth daily  -     losartan (COZAAR) 50 MG tablet; Take 1 tablet (50 mg) by mouth daily  -     Albumin Random Urine Quantitative with Creat Ratio; Future    Age-related osteoporosis without current pathological fracture    Acquired atrophy of thyroid  Controlled, continue with current medications  -  " "   TSH WITH FREE T4 REFLEX; Future  -     levothyroxine (SYNTHROID/LEVOTHROID) 100 MCG tablet; Take 1 tablet (100 mcg) by mouth daily    Other orders  -     REVIEW OF HEALTH MAINTENANCE PROTOCOL ORDERS  -     PRIMARY CARE FOLLOW-UP SCHEDULING; Future    Patient declines vaccines today.    Patient has been advised of split billing requirements and indicates understanding: Yes      COUNSELING:  Reviewed preventive health counseling, as reflected in patient instructions       Regular exercise       Healthy diet/nutrition       Vision screening       Hearing screening       Dental care       Bladder control       Fall risk prevention       Advanced Planning       BMI:   Estimated body mass index is 25.38 kg/m  as calculated from the following:    Height as of this encounter: 1.727 m (5' 8\").    Weight as of this encounter: 75.7 kg (166 lb 14.4 oz).         She reports that she has quit smoking. Her smoking use included cigarettes. She has a 1.25 pack-year smoking history. She has never used smokeless tobacco.      Appropriate preventive services were discussed with this patient, including applicable screening as appropriate for fall prevention, nutrition, physical activity, Tobacco-use cessation, weight loss and cognition.  Checklist reviewing preventive services available has been given to the patient.    Reviewed patients plan of care and provided an AVS. The Basic Care Plan (routine screening as documented in Health Maintenance) for Daysi meets the Care Plan requirement. This Care Plan has been established and reviewed with the Patient.          Nelson Churchill MD  [facility]    Identified Health Risks:  I have reviewed Opioid Use Disorder and Substance Use Disorder risk factors and made any needed referrals.   "

## 2023-10-02 NOTE — LETTER
October 3, 2023      Daysi Harley  [address]   [place] [address]        Dear [facility],    We are writing to inform you of your test results.    Your test results fall within the expected range(s) or remain unchanged from previous results.  Please continue with current treatment plan.    Resulted Orders   BASIC METABOLIC PANEL   Result Value Ref Range    Sodium 143 135 - 145 mmol/L      Comment:      Reference intervals for this test were updated on 09/26/2023 to more accurately reflect our healthy population. There may be differences in the flagging of prior results with similar values performed with this method. Interpretation of those prior results can be made in the context of the updated reference intervals.     Potassium 3.5 3.4 - 5.3 mmol/L    Chloride 104 98 - 107 mmol/L    Carbon Dioxide (CO2) 24 22 - 29 mmol/L    Anion Gap 15 7 - 15 mmol/L    Urea Nitrogen 18.0 8.0 - 23.0 mg/dL    Creatinine 0.77 0.51 - 0.95 mg/dL    GFR Estimate 79 >60 mL/min/1.73m2    Calcium 9.4 8.8 - 10.2 mg/dL    Glucose 121 (H) 70 - 99 mg/dL   Lipid panel reflex to direct LDL Non-fasting   Result Value Ref Range    Cholesterol 189 <200 mg/dL    Triglycerides 113 <150 mg/dL    Direct Measure HDL 55 >=50 mg/dL    LDL Cholesterol Calculated 111 (H) <=100 mg/dL    Non HDL Cholesterol 134 (H) <130 mg/dL    Narrative    Cholesterol  Desirable:  <200 mg/dL    Triglycerides  Normal:  Less than 150 mg/dL  Borderline High:  150-199 mg/dL  High:  200-499 mg/dL  Very High:  Greater than or equal to 500 mg/dL    Direct Measure HDL  Female:  Greater than or equal to 50 mg/dL   Male:  Greater than or equal to 40 mg/dL    LDL Cholesterol  Desirable:  <100mg/dL  Above Desirable:  100-129 mg/dL   Borderline High:  130-159 mg/dL   High:  160-189 mg/dL   Very High:  >= 190 mg/dL    Non HDL Cholesterol  Desirable:  130 mg/dL  Above Desirable:  130-159 mg/dL  Borderline High:  160-189 mg/dL  High:  190-219 mg/dL  Very High:  Greater than  or equal to 220 mg/dL   TSH WITH FREE T4 REFLEX   Result Value Ref Range    TSH 1.43 0.30 - 4.20 uIU/mL   Albumin Random Urine Quantitative with Creat Ratio   Result Value Ref Range    Creatinine Urine mg/dL 35.4 mg/dL      Comment:      The reference ranges have not been established in urine creatinine. The results should be integrated into the clinical context for interpretation.    Albumin Urine mg/L <12.0 mg/L      Comment:      The reference ranges have not been established in urine albumin. The results should be integrated into the clinical context for interpretation.    Albumin Urine mg/g Cr        Comment:      Unable to calculate, urine albumin and/or urine creatinine is outside detectable limits.  Microalbuminuria is defined as an albumin:creatinine ratio of 17 to 299 for males and 25 to 299 for females. A ratio of albumin:creatinine of 300 or higher is indicative of overt proteinuria.  Due to biologic variability, positive results should be confirmed by a second, first-morning random or 24-hour timed urine specimen. If there is discrepancy, a third specimen is recommended. When 2 out of 3 results are in the microalbuminuria range, this is evidence for incipient nephropathy and warrants increased efforts at glucose control, blood pressure control, and institution of therapy with an angiotensin-converting-enzyme (ACE) inhibitor (if the patient can tolerate it).         If you have any questions or concerns, please call the clinic at the number listed above.       Sincerely,      Nelson Churchill MD

## 2024-09-25 ENCOUNTER — OFFICE VISIT (OUTPATIENT)
Dept: FAMILY MEDICINE | Facility: CLINIC | Age: 79
End: 2024-09-25
Payer: COMMERCIAL

## 2024-09-25 VITALS
BODY MASS INDEX: 25.29 KG/M2 | OXYGEN SATURATION: 99 % | RESPIRATION RATE: 16 BRPM | WEIGHT: 166.9 LBS | SYSTOLIC BLOOD PRESSURE: 163 MMHG | HEART RATE: 72 BPM | HEIGHT: 68 IN | TEMPERATURE: 98.1 F | DIASTOLIC BLOOD PRESSURE: 93 MMHG

## 2024-09-25 DIAGNOSIS — E78.2 MIXED HYPERLIPIDEMIA: ICD-10-CM

## 2024-09-25 DIAGNOSIS — Z00.00 ENCOUNTER FOR MEDICARE ANNUAL WELLNESS EXAM: Primary | ICD-10-CM

## 2024-09-25 DIAGNOSIS — E03.4 ACQUIRED ATROPHY OF THYROID: ICD-10-CM

## 2024-09-25 DIAGNOSIS — M81.0 AGE-RELATED OSTEOPOROSIS WITHOUT CURRENT PATHOLOGICAL FRACTURE: ICD-10-CM

## 2024-09-25 DIAGNOSIS — I10 PRIMARY HYPERTENSION: ICD-10-CM

## 2024-09-25 LAB
ANION GAP SERPL CALCULATED.3IONS-SCNC: 12 MMOL/L (ref 7–15)
BUN SERPL-MCNC: 15.8 MG/DL (ref 8–23)
CALCIUM SERPL-MCNC: 9.4 MG/DL (ref 8.8–10.4)
CHLORIDE SERPL-SCNC: 100 MMOL/L (ref 98–107)
CHOLEST SERPL-MCNC: 178 MG/DL
CREAT SERPL-MCNC: 0.68 MG/DL (ref 0.51–0.95)
CREAT UR-MCNC: 67.9 MG/DL
EGFRCR SERPLBLD CKD-EPI 2021: 88 ML/MIN/1.73M2
FASTING STATUS PATIENT QL REPORTED: ABNORMAL
FASTING STATUS PATIENT QL REPORTED: ABNORMAL
GLUCOSE SERPL-MCNC: 120 MG/DL (ref 70–99)
HCO3 SERPL-SCNC: 26 MMOL/L (ref 22–29)
HDLC SERPL-MCNC: 49 MG/DL
LDLC SERPL CALC-MCNC: 103 MG/DL
MICROALBUMIN UR-MCNC: 17.4 MG/L
MICROALBUMIN/CREAT UR: 25.63 MG/G CR (ref 0–25)
NONHDLC SERPL-MCNC: 129 MG/DL
POTASSIUM SERPL-SCNC: 3.7 MMOL/L (ref 3.4–5.3)
SODIUM SERPL-SCNC: 138 MMOL/L (ref 135–145)
TRIGL SERPL-MCNC: 130 MG/DL
TSH SERPL DL<=0.005 MIU/L-ACNC: 1.38 UIU/ML (ref 0.3–4.2)

## 2024-09-25 PROCEDURE — 99214 OFFICE O/P EST MOD 30 MIN: CPT | Mod: 25 | Performed by: FAMILY MEDICINE

## 2024-09-25 PROCEDURE — G0439 PPPS, SUBSEQ VISIT: HCPCS | Performed by: FAMILY MEDICINE

## 2024-09-25 PROCEDURE — 80048 BASIC METABOLIC PNL TOTAL CA: CPT | Performed by: FAMILY MEDICINE

## 2024-09-25 PROCEDURE — 80061 LIPID PANEL: CPT | Performed by: FAMILY MEDICINE

## 2024-09-25 PROCEDURE — 84443 ASSAY THYROID STIM HORMONE: CPT | Performed by: FAMILY MEDICINE

## 2024-09-25 PROCEDURE — 36415 COLL VENOUS BLD VENIPUNCTURE: CPT | Performed by: FAMILY MEDICINE

## 2024-09-25 PROCEDURE — 82570 ASSAY OF URINE CREATININE: CPT | Performed by: FAMILY MEDICINE

## 2024-09-25 PROCEDURE — 82043 UR ALBUMIN QUANTITATIVE: CPT | Performed by: FAMILY MEDICINE

## 2024-09-25 RX ORDER — LEVOTHYROXINE SODIUM 100 UG/1
100 TABLET ORAL DAILY
Qty: 90 TABLET | Refills: 3 | Status: SHIPPED | OUTPATIENT
Start: 2024-09-25

## 2024-09-25 RX ORDER — LOSARTAN POTASSIUM 50 MG/1
50 TABLET ORAL DAILY
Qty: 90 TABLET | Refills: 3 | Status: SHIPPED | OUTPATIENT
Start: 2024-09-25

## 2024-09-25 RX ORDER — HYDROCHLOROTHIAZIDE 25 MG/1
25 TABLET ORAL DAILY
Qty: 90 TABLET | Refills: 3 | Status: SHIPPED | OUTPATIENT
Start: 2024-09-25

## 2024-09-25 RX ORDER — ATORVASTATIN CALCIUM 10 MG/1
10 TABLET, FILM COATED ORAL DAILY
Qty: 90 TABLET | Refills: 3 | Status: SHIPPED | OUTPATIENT
Start: 2024-09-25

## 2024-09-25 NOTE — LETTER
September 27, 2024      Daysi Harley  [address]   [facility] [place] [address]        Dear ,    We are writing to inform you of your test results.    Your test results fall within the expected range(s) or remain unchanged from previous results.  Please continue with current treatment plan.    Resulted Orders   BASIC METABOLIC PANEL   Result Value Ref Range    Sodium 138 135 - 145 mmol/L    Potassium 3.7 3.4 - 5.3 mmol/L    Chloride 100 98 - 107 mmol/L    Carbon Dioxide (CO2) 26 22 - 29 mmol/L    Anion Gap 12 7 - 15 mmol/L    Urea Nitrogen 15.8 8.0 - 23.0 mg/dL    Creatinine 0.68 0.51 - 0.95 mg/dL    GFR Estimate 88 >60 mL/min/1.73m2      Comment:      eGFR calculated using 2021 CKD-EPI equation.    Calcium 9.4 8.8 - 10.4 mg/dL      Comment:      Reference intervals for this test were updated on 7/16/2024 to reflect our healthy population more accurately. There may be differences in the flagging of prior results with similar values performed with this method. Those prior results can be interpreted in the context of the updated reference intervals.    Glucose 120 (H) 70 - 99 mg/dL    Patient Fasting > 8hrs? Unknown    Lipid panel reflex to direct LDL Non-fasting   Result Value Ref Range    Cholesterol 178 <200 mg/dL    Triglycerides 130 <150 mg/dL    Direct Measure HDL 49 (L) >=50 mg/dL    LDL Cholesterol Calculated 103 (H) <100 mg/dL    Non HDL Cholesterol 129 <130 mg/dL    Patient Fasting > 8hrs? Unknown     Narrative    Cholesterol  Desirable: < 200 mg/dL  Borderline High: 200 - 239 mg/dL  High: >= 240 mg/dL    Triglycerides  Normal: < 150 mg/dL  Borderline High: 150 - 199 mg/dL  High: 200-499 mg/dL  Very High: >= 500 mg/dL    Direct Measure HDL  Female: >= 50 mg/dL   Male: >= 40 mg/dL    LDL Cholesterol  Desirable: < 100 mg/dL  Above Desirable: 100 - 129 mg/dL   Borderline High: 130 - 159 mg/dL   High:  160 - 189 mg/dL   Very High: >= 190 mg/dL    Non HDL Cholesterol  Desirable: < 130 mg/dL  Above  Desirable: 130 - 159 mg/dL  Borderline High: 160 - 189 mg/dL  High: 190 - 219 mg/dL  Very High: >= 220 mg/dL   Albumin Random Urine Quantitative with Creat Ratio   Result Value Ref Range    Creatinine Urine mg/dL 67.9 mg/dL      Comment:      The reference ranges have not been established in urine creatinine. The results should be integrated into the clinical context for interpretation.    Albumin Urine mg/L 17.4 mg/L      Comment:      The reference ranges have not been established in urine albumin. The results should be integrated into the clinical context for interpretation.    Albumin Urine mg/g Cr 25.63 (H) 0.00 - 25.00 mg/g Cr      Comment:      Microalbuminuria is defined as an albumin:creatinine ratio of 17 to 299 for males and 25 to 299 for females. A ratio of albumin:creatinine of 300 or higher is indicative of overt proteinuria.  Due to biologic variability, positive results should be confirmed by a second, first-morning random or 24-hour timed urine specimen. If there is discrepancy, a third specimen is recommended. When 2 out of 3 results are in the microalbuminuria range, this is evidence for incipient nephropathy and warrants increased efforts at glucose control, blood pressure control, and institution of therapy with an angiotensin-converting-enzyme (ACE) inhibitor (if the patient can tolerate it).     TSH WITH FREE T4 REFLEX   Result Value Ref Range    TSH 1.38 0.30 - 4.20 uIU/mL       If you have any questions or concerns, please call the clinic at the number listed above.       Sincerely,      Nelson Churchill MD

## 2024-09-25 NOTE — PROGRESS NOTES
"Preventive Care Visit  [facility] [place]  Nelson Churchill MD, Family Medicine  Sep 25, 2024      Assessment & Plan     Encounter for Medicare annual wellness exam  We have discussed health maintenance, heart healthy diet, regular activity, immunizations    Age-related osteoporosis without current pathological fracture  Bone density testing has been ordered.  She has had a stable bone density over the last 3 years.  - DEXA HIP/PELVIS/SPINE - Future; Future    Primary hypertension  Controlled, continue with current medication, check labs she has elevated numbers here in clinic.  Her numbers at home are at goal.  - BASIC METABOLIC PANEL; Future  - Albumin Random Urine Quantitative with Creat Ratio; Future  - hydrochlorothiazide (HYDRODIURIL) 25 MG tablet; Take 1 tablet (25 mg) by mouth daily.  - losartan (COZAAR) 50 MG tablet; Take 1 tablet (50 mg) by mouth daily.    Mixed hyperlipidemia  Controlled, tolerating medication, continue current dose and check lipid panel  - Lipid panel reflex to direct LDL Non-fasting; Future  - atorvastatin (LIPITOR) 10 MG tablet; Take 1 tablet (10 mg) by mouth daily.    Acquired atrophy of thyroid  Stable, continue with current medication, check TSH  - TSH WITH FREE T4 REFLEX; Future  - levothyroxine (SYNTHROID/LEVOTHROID) 100 MCG tablet; Take 1 tablet (100 mcg) by mouth daily.    Patient has been advised of split billing requirements and indicates understanding: Yes        BMI  Estimated body mass index is 25.38 kg/m  as calculated from the following:    Height as of this encounter: 1.727 m (5' 8\").    Weight as of this encounter: 75.7 kg (166 lb 14.4 oz).       Counseling  Appropriate preventive services were addressed with this patient via screening, questionnaire, or discussion as appropriate for fall prevention, nutrition, physical activity, Tobacco-use cessation, social engagement, weight loss and cognition.  Checklist reviewing preventive services available " has been given to the patient.  Reviewed patient's diet, addressing concerns and/or questions.   The patient was instructed to see the dentist every 6 months.           Naheed Tavarez is a 78 year old, presenting for the following:  Wellness Visit (AWV)        9/25/2024     7:49 AM   Additional Questions   Roomed by Selma LARSON CMA   Accompanied by Self         Health Care Directive  Patient does not have a Health Care Directive or Living Will: Patient states has Advance Directive and will bring in a copy to clinic.    HPI  Patient is here for AMV. She has been doing well and feels well.  She is seeing Dr. Hernandez for macular degeneration and is receiving injections.    Patient is here for chronic disease follow up       HTN      Current medications:  losartan, HCTZ  Medication side effects:  none   Medication concerns:  none, feels better with lower dose of losartan   Talking medications as prescribed:  yes  Blood pressure controlled:  yes  Home monitor numbers:  120/80   End organ symptoms:  no    HLD     Medication:  atorvastatin  LDL:  at goal  Myalgia:  no           9/25/2024   General Health   How would you rate your overall physical health? Good   Feel stress (tense, anxious, or unable to sleep) Not at all            9/25/2024   Nutrition   Diet: Regular (no restrictions)            9/25/2024   Exercise   Days per week of moderate/strenous exercise 7 days            9/25/2024   Social Factors   Frequency of gathering with friends or relatives More than three times a week   Worry food won't last until get money to buy more No   Food not last or not have enough money for food? No   Do you have housing? (Housing is defined as stable permanent housing and does not include staying ouside in a car, in a tent, in an abandoned building, in an overnight shelter, or couch-surfing.) Yes   Are you worried about losing your housing? No   Lack of transportation? No   Unable to get utilities (heat,electricity)? No             9/25/2024   Fall Risk   Fallen 2 or more times in the past year? No   Trouble with walking or balance? No             9/25/2024   Activities of Daily Living- Home Safety   Needs help with the following daily activites None of the above   Safety concerns in the home None of the above            9/25/2024   Dental   Dentist two times every year? (!) NO            9/25/2024   Hearing Screening   Hearing concerns? None of the above            9/25/2024   Driving Risk Screening   Patient/family members have concerns about driving No            9/25/2024   General Alertness/Fatigue Screening   Have you been more tired than usual lately? No            9/25/2024   Urinary Incontinence Screening   Bothered by leaking urine in past 6 months No            9/25/2024   TB Screening   Were you born outside of the [place]? No            Today's PHQ-2 Score:       9/25/2024     7:37 AM   PHQ-2 ( 1999 [employer])   Q1: Little interest or pleasure in doing things 0   Q2: Feeling down, depressed or hopeless 0   PHQ-2 Score 0   Q1: Little interest or pleasure in doing things Not at all   Q2: Feeling down, depressed or hopeless Not at all   PHQ-2 Score 0           9/25/2024   Substance Use   Alcohol more than 3/day or more than 7/wk No   Do you have a current opioid prescription? No   How severe/bad is pain from 1 to 10? 0/10 (No Pain)   Do you use any other substances recreationally? No        Social History     Tobacco Use    Smoking status: Former     Current packs/day: 0.25     Average packs/day: 0.3 packs/day for 5.0 years (1.3 ttl pk-yrs)     Types: Cigarettes     Passive exposure: Past    Smokeless tobacco: Never   Vaping Use    Vaping status: Never Used   Substance Use Topics    Alcohol use: No    Drug use: Never              ASCVD Risk   The 10-year ASCVD risk score (Desi DAVE, et al., 2019) is: 52.1%    Values used to calculate the score:      Age: 78 years      Sex: Female      Is Non- : No       "Diabetic: No      Tobacco smoker: No      Systolic Blood Pressure: 179 mmHg      Is BP treated: Yes      HDL Cholesterol: 55 mg/dL      Total Cholesterol: 189 mg/dL            Reviewed and updated as needed this visit by Provider                      Current providers sharing in care for this patient include:  Patient Care Team:  Nelson Churchill MD as PCP - General (Family Medicine)  Frw, None as PCP - Obstetrics/Gynecology  Kian Velarde MD as PCP - Ophthalmology  Nelson Churchill MD as Assigned PCP    The following health maintenance items are reviewed in Epic and correct as of today:  Health Maintenance   Topic Date Due    RSV VACCINE (1 - 1-dose 75+ series) Never done    DEXA  09/24/2023    INFLUENZA VACCINE (1) 09/01/2024    MEDICARE ANNUAL WELLNESS VISIT  10/02/2024    BMP  10/02/2024    LIPID  10/02/2024    MICROALBUMIN  10/02/2024    TSH W/FREE T4 REFLEX  10/02/2024    ANNUAL REVIEW OF HM ORDERS  10/02/2024    FALL RISK ASSESSMENT  09/25/2025    GLUCOSE  10/02/2026    ADVANCE CARE PLANNING  10/02/2028    DTAP/TDAP/TD IMMUNIZATION (2 - Td or Tdap) 03/13/2029    PHQ-2 (once per calendar year)  Completed    Pneumococcal Vaccine: 65+ Years  Completed    URINALYSIS  Completed    HPV IMMUNIZATION  Aged Out    MENINGITIS IMMUNIZATION  Aged Out    RSV MONOCLONAL ANTIBODY  Aged Out    HEPATITIS C SCREENING  Discontinued    MAMMO SCREENING  Discontinued    COLORECTAL CANCER SCREENING  Discontinued    ZOSTER IMMUNIZATION  Discontinued    LUNG CANCER SCREENING  Discontinued    COVID-19 Vaccine  Discontinued            Objective    Exam  BP (!) 179/95 (BP Location: Right arm, Patient Position: Sitting, Cuff Size: Adult Regular)   Pulse 70   Temp 98.1  F (36.7  C) (Tympanic)   Resp 16   Ht 1.727 m (5' 8\")   Wt 75.7 kg (166 lb 14.4 oz)   SpO2 99%   BMI 25.38 kg/m     Estimated body mass index is 25.38 kg/m  as calculated from the following:    Height as of this encounter: 1.727 m (5' 8\").    Weight as " of this encounter: 75.7 kg (166 lb 14.4 oz).    Physical Exam  GENERAL: alert and no distress  EYES: Eyes grossly normal to inspection, PERRL and conjunctivae and sclerae normal  HENT: ear canals and TM's normal, nose and mouth without ulcers or lesions  NECK: no adenopathy, no asymmetry, masses, or scars  RESP: lungs clear to auscultation - no rales, rhonchi or wheezes  CV: regular rate and rhythm, normal S1 S2, no S3 or S4, no murmur, click or rub, no peripheral edema  ABDOMEN: soft, nontender, no hepatosplenomegaly, no masses and bowel sounds normal  MS: no gross musculoskeletal defects noted, no edema  PSYCH: mentation appears normal, affect normal/bright        9/25/2024   Mini Cog   Clock Draw Score 2 Normal   3 Item Recall 3 objects recalled   Mini Cog Total Score 5                 Signed Electronically by: Nelson Churchill MD

## 2024-10-08 ENCOUNTER — OFFICE VISIT (OUTPATIENT)
Dept: FAMILY MEDICINE | Facility: CLINIC | Age: 79
End: 2024-10-08
Payer: COMMERCIAL

## 2024-10-08 VITALS
SYSTOLIC BLOOD PRESSURE: 162 MMHG | RESPIRATION RATE: 24 BRPM | HEART RATE: 76 BPM | HEIGHT: 68 IN | BODY MASS INDEX: 24.98 KG/M2 | WEIGHT: 164.8 LBS | TEMPERATURE: 97.4 F | DIASTOLIC BLOOD PRESSURE: 85 MMHG | OXYGEN SATURATION: 96 %

## 2024-10-08 DIAGNOSIS — M81.0 AGE-RELATED OSTEOPOROSIS WITHOUT CURRENT PATHOLOGICAL FRACTURE: ICD-10-CM

## 2024-10-08 DIAGNOSIS — H61.23 CERUMINOSIS, BILATERAL: Primary | ICD-10-CM

## 2024-10-08 PROCEDURE — 69210 REMOVE IMPACTED EAR WAX UNI: CPT | Performed by: FAMILY MEDICINE

## 2024-10-08 PROCEDURE — 99214 OFFICE O/P EST MOD 30 MIN: CPT | Mod: 25 | Performed by: FAMILY MEDICINE

## 2024-10-08 RX ORDER — RISEDRONATE SODIUM 35 MG/1
35 TABLET, FILM COATED ORAL
Qty: 12 TABLET | Refills: 3 | Status: SHIPPED | OUTPATIENT
Start: 2024-10-08

## 2024-10-08 NOTE — PROGRESS NOTES
"  Assessment & Plan     Ceruminosis, bilateral  Cerumen was removed with combination of curet and lavage revealing normal TM       Age-related osteoporosis without current pathological fracture  Discussed her recent bone density.  She had a slight increase in the density of her lumbar spine with a slight decrease in the density of her hips.  This does increase her risk for fracture.  She took alendronate in the past and apparently had a reaction associate with itching and she stopped the medication.  She does not remember how long ago this was.  Trial of risedronate 35 mg weekly.  - RISEdronate (ACTONEL) 35 MG tablet; Take 1 tablet (35 mg) by mouth every 7 days.          BMI  Estimated body mass index is 25.06 kg/m  as calculated from the following:    Height as of this encounter: 1.727 m (5' 8\").    Weight as of this encounter: 74.8 kg (164 lb 12.8 oz).             Naheed Tavarez is a 78 year old, presenting for the following health issues:  Ear Problem (Would like ears flushed per audiologist.   Had recent hearing aid check.  Left ear worse than right)      10/8/2024     1:24 PM   Additional Questions   Roomed by Selma LARSON CMA   Accompanied by Self     Ear Problem    History of Present Illness       Reason for visit:  Ear cleaning   She is taking medications regularly.     Patient is here at the request of her audiologist.  She has mild ceruminosis.  This is impacting her hearing aids.  She also would like to discuss her recent bone density exam.  This shows mild worsening of her osteoporosis.            Objective    BP (!) 162/85 (BP Location: Right arm, Patient Position: Sitting, Cuff Size: Adult Regular)   Pulse 76   Temp 97.4  F (36.3  C) (Tympanic)   Resp 24   Ht 1.727 m (5' 8\")   Wt 74.8 kg (164 lb 12.8 oz)   SpO2 96%   BMI 25.06 kg/m    Body mass index is 25.06 kg/m .  Physical Exam   Alert, oriented, no acute distress  Ear canals show mild cerumen, TMs appear clear  Neck supple  Normal heart " rate  Nonlabored breathing            Signed Electronically by: Nelson Churchill MD

## 2024-10-09 ENCOUNTER — TELEPHONE (OUTPATIENT)
Dept: FAMILY MEDICINE | Facility: CLINIC | Age: 79
End: 2024-10-09
Payer: COMMERCIAL

## 2024-10-09 NOTE — TELEPHONE ENCOUNTER
Prior Authorization Retail Medication Request    Medication/Dose: PA needed for risedronate sod 35 mg IR tablet    Diagnosis and ICD code (if different than what is on RX):  see rx details  New/renewal/insurance change PA/secondary ins. PA:  Previously Tried and Failed:  alendronate  Rationale:  pt experienced SE/allergic rxn to alendronate    Insurance   Primary: see chart  Insurance ID:      Secondary (if applicable):  Insurance ID:      Pharmacy Information (if different than what is on RX)  Name:  see rx details  Phone:    Fax:    Clinic Information  Preferred routing pool for dept communication:

## 2024-10-14 NOTE — TELEPHONE ENCOUNTER
Central Prior Authorization Team   Phone: [phone]    PA Initiation    Medication: PA needed for risedronate sod 35 mg IR tablet  Insurance Company: [email or web address] ([facility]) - Phone [phone] Fax [phone]  Pharmacy Filling the Rx: [id number] [place], [place] - [address]  AT [facility] OF [address]  Filling Pharmacy Phone: [phone]  Filling Pharmacy Fax: [phone]  Start Date: 10/14/2024

## 2024-10-16 NOTE — TELEPHONE ENCOUNTER
Prior Authorization Approval    Authorization Effective Date: 10/15/2024  Authorization Expiration Date:    Medication: PA needed for risedronate sod 35 mg IR tablet-PA APPROVED   Approved Dose/Quantity:   Reference #:     Insurance Company: [employer] ([facility]) - Phone [phone] Fax [phone]  Expected CoPay:       CoPay Card Available:      Foundation Assistance Needed:    Which Pharmacy is filling the prescription (Not needed for infusion/clinic administered): [id number] - [place] [address] [facility] [address] AT [facility] OF [address] & [facility]  Pharmacy Notified:  Yes- **Instructed pharmacy to notify patient when script is ready to /ship.**-Pharmacy stated that they received notice of Approval from insurance and was able to process medication with a paid claim on medication. Patient has picked up medication.   Patient Notified:  Yes    Approval information was sent to filling pharmacy and was notify on Approval.

## 2025-08-26 ENCOUNTER — PATIENT OUTREACH (OUTPATIENT)
Dept: CARE COORDINATION | Facility: CLINIC | Age: 80
End: 2025-08-26
Payer: COMMERCIAL

## 2025-08-28 ENCOUNTER — TELEPHONE (OUTPATIENT)
Dept: FAMILY MEDICINE | Facility: CLINIC | Age: 80
End: 2025-08-28
Payer: COMMERCIAL

## 2025-08-28 DIAGNOSIS — E78.2 MIXED HYPERLIPIDEMIA: ICD-10-CM

## 2025-08-28 DIAGNOSIS — I10 PRIMARY HYPERTENSION: ICD-10-CM

## 2025-08-28 DIAGNOSIS — M81.0 AGE-RELATED OSTEOPOROSIS WITHOUT CURRENT PATHOLOGICAL FRACTURE: ICD-10-CM

## 2025-08-28 DIAGNOSIS — E03.4 ACQUIRED ATROPHY OF THYROID: ICD-10-CM

## 2025-08-28 RX ORDER — ATORVASTATIN CALCIUM 10 MG/1
10 TABLET, FILM COATED ORAL DAILY
Qty: 90 TABLET | Refills: 0 | Status: SHIPPED | OUTPATIENT
Start: 2025-08-28

## 2025-08-28 RX ORDER — LEVOTHYROXINE SODIUM 100 UG/1
100 TABLET ORAL DAILY
Qty: 90 TABLET | Refills: 0 | Status: SHIPPED | OUTPATIENT
Start: 2025-08-28

## 2025-08-28 RX ORDER — HYDROCHLOROTHIAZIDE 25 MG/1
25 TABLET ORAL DAILY
Qty: 90 TABLET | Refills: 0 | Status: SHIPPED | OUTPATIENT
Start: 2025-08-28

## 2025-08-28 RX ORDER — LOSARTAN POTASSIUM 50 MG/1
50 TABLET ORAL DAILY
Qty: 90 TABLET | Refills: 0 | Status: SHIPPED | OUTPATIENT
Start: 2025-08-28

## 2025-08-28 RX ORDER — RISEDRONATE SODIUM 35 MG/1
35 TABLET, FILM COATED ORAL
Qty: 12 TABLET | Refills: 0 | Status: SHIPPED | OUTPATIENT
Start: 2025-08-28